# Patient Record
Sex: FEMALE | Race: WHITE | NOT HISPANIC OR LATINO | Employment: FULL TIME | URBAN - NONMETROPOLITAN AREA
[De-identification: names, ages, dates, MRNs, and addresses within clinical notes are randomized per-mention and may not be internally consistent; named-entity substitution may affect disease eponyms.]

---

## 2018-01-16 PROBLEM — Z81.8 FAMILY HISTORY OF PSYCHIATRIC CONDITION: Status: ACTIVE | Noted: 2018-01-16

## 2018-01-16 RX ORDER — CALCIUM CARBONATE 500 MG/1
TABLET, CHEWABLE ORAL
COMMUNITY

## 2019-01-29 ENCOUNTER — OFFICE VISIT (OUTPATIENT)
Dept: FAMILY MEDICINE | Facility: OTHER | Age: 39
End: 2019-01-29
Attending: FAMILY MEDICINE
Payer: COMMERCIAL

## 2019-01-29 VITALS
RESPIRATION RATE: 24 BRPM | BODY MASS INDEX: 34.55 KG/M2 | TEMPERATURE: 97.8 F | WEIGHT: 195 LBS | SYSTOLIC BLOOD PRESSURE: 112 MMHG | HEART RATE: 84 BPM | DIASTOLIC BLOOD PRESSURE: 70 MMHG | HEIGHT: 63 IN

## 2019-01-29 DIAGNOSIS — Z00.00 ANNUAL PHYSICAL EXAM: Primary | ICD-10-CM

## 2019-01-29 DIAGNOSIS — Z12.4 CERVICAL CANCER SCREENING: ICD-10-CM

## 2019-01-29 DIAGNOSIS — Z30.432 ENCOUNTER FOR IUD REMOVAL: ICD-10-CM

## 2019-01-29 LAB — HCG UR QL: NEGATIVE

## 2019-01-29 PROCEDURE — 88142 CYTOPATH C/V THIN LAYER: CPT | Performed by: FAMILY MEDICINE

## 2019-01-29 PROCEDURE — 81025 URINE PREGNANCY TEST: CPT | Performed by: FAMILY MEDICINE

## 2019-01-29 PROCEDURE — 99395 PREV VISIT EST AGE 18-39: CPT | Mod: 25 | Performed by: FAMILY MEDICINE

## 2019-01-29 PROCEDURE — G0123 SCREEN CERV/VAG THIN LAYER: HCPCS | Performed by: FAMILY MEDICINE

## 2019-01-29 PROCEDURE — 58301 REMOVE INTRAUTERINE DEVICE: CPT | Performed by: FAMILY MEDICINE

## 2019-01-29 RX ORDER — BACLOFEN 20 MG
TABLET ORAL
COMMUNITY

## 2019-01-29 RX ORDER — IBUPROFEN/PSEUDOEPHEDRINE HCL 200MG-30MG
TABLET ORAL
COMMUNITY

## 2019-01-29 ASSESSMENT — PATIENT HEALTH QUESTIONNAIRE - PHQ9
5. POOR APPETITE OR OVEREATING: NEARLY EVERY DAY
SUM OF ALL RESPONSES TO PHQ QUESTIONS 1-9: 19

## 2019-01-29 ASSESSMENT — PAIN SCALES - GENERAL: PAINLEVEL: NO PAIN (0)

## 2019-01-29 ASSESSMENT — ANXIETY QUESTIONNAIRES
5. BEING SO RESTLESS THAT IT IS HARD TO SIT STILL: MORE THAN HALF THE DAYS
6. BECOMING EASILY ANNOYED OR IRRITABLE: MORE THAN HALF THE DAYS
7. FEELING AFRAID AS IF SOMETHING AWFUL MIGHT HAPPEN: NEARLY EVERY DAY
IF YOU CHECKED OFF ANY PROBLEMS ON THIS QUESTIONNAIRE, HOW DIFFICULT HAVE THESE PROBLEMS MADE IT FOR YOU TO DO YOUR WORK, TAKE CARE OF THINGS AT HOME, OR GET ALONG WITH OTHER PEOPLE: SOMEWHAT DIFFICULT
1. FEELING NERVOUS, ANXIOUS, OR ON EDGE: NEARLY EVERY DAY
3. WORRYING TOO MUCH ABOUT DIFFERENT THINGS: NEARLY EVERY DAY
GAD7 TOTAL SCORE: 19
2. NOT BEING ABLE TO STOP OR CONTROL WORRYING: NEARLY EVERY DAY

## 2019-01-29 ASSESSMENT — MIFFLIN-ST. JEOR: SCORE: 1534.67

## 2019-01-29 NOTE — NURSING NOTE
Patient is here for annual physical, patient states has iud, has been 6 years.  Meera Villanueva LPN .............1/29/2019     3:17 PM        No LMP recorded (lmp unknown). Patient is not currently having periods (Reason: IUD).  Medication Reconciliation: complete    Meera Villanueva LPN  1/29/2019 3:23 PM

## 2019-01-29 NOTE — PROGRESS NOTES
Female Physical Note    Concerns today:     38-year-old female presents for her annual physical.  She has not been seen in over 5 years due to lack of health insurance. IUD has now been in place for over 6 years.  She reports the last 2 months having intermittent vaginal bleeding.  She is unable to locate her strings.  She has had the same sexual partner.  She would like to have the IUD replaced as this is her form of birth control.  She has no history of abnormal Pap smears.  Nursing Notes:   Meera Villanueva LPN  2019  3:23 PM  Sign at exiting of workspace  Patient is here for annual physical, patient states has iud, has been 6 years.  Meera Villanueva LPN .............2019     3:17 PM        No LMP recorded (lmp unknown). Patient is not currently having periods (Reason: IUD).  Medication Reconciliation: complete    Meera Villanueva LPN  2019 3:23 PM          ROS:  CONSTITUTIONAL: no fatigue, no unexpected change in weight  SKIN: no worrisome rashes, no worrisome moles, no worrisome lesions  EYES: no acute vision problems or changes  ENT: no ear problems, no mouth problems, no throat problems  RESP: no significant cough, no shortness of breath  CV: no chest pain, no palpitations, no new or worsening peripheral edema  GI: no nausea, no vomiting, no constipation, no diarrhea    Sexually Active: Yes  Sexual concerns: No   Contraception:IUD   P: 0  Menarche:  No LMP recorded (lmp unknown). Patient is not currently having periods (Reason: IUD). Menses: q 60 days  STD History: Neg  Last Pap Smear Date:  normal  Abnormal Pap History: None    Patient Active Problem List   Diagnosis     Allergic rhinitis     Breast hypertrophy     Encounter for other general counseling or advice on contraception     Family history of psychiatric condition     Carbuncle and furuncle     Cervicitis and endocervicitis     Obesity       Current Outpatient Medications   Medication Sig Dispense Refill     levonorgestrel  (MIRENA) 20 MCG/24HR IUD Due to come out 2018       Magnesium Oxide 500 MG TABS        Melatonin 3 MG TBDP        calcium carbonate (TUMS) 500 MG chewable tablet        diphenhydrAMINE-acetaminophen (TYLENOL PM)  MG tablet          Past Medical History:   Diagnosis Date     Allergy status to unspecified drugs, medicaments and biological substances status     nfections- allergies     Anxiety disorder     Anxiety Disorder     Other specified postprocedural states     2005,Elective     Personal history of other medical treatment (CODE)     2005,Abn. pap 2005        Family History     Problem (# of Occurrences) Relation (Name,Age of Onset)    Arthritis (1) Paternal Grandmother: Arthritis    Cancer (1) Father: Cancer,brain tumor    Diabetes (1) Paternal Aunt: Diabetes    Family History Negative (3) Sister: Good Health, Brother: Good Health,unsure of where abouts due to mother lost children, Sister: Good Health,unsure of where about due to mother lost children    Genetic Disorder (2) Other: Genetic,FH Heart Disease-MGM,  age 57-FH Hypertension-mother-FH Thyroid Problems-mother-FH Depression-mother (bipolar), MGM-FH Wtayjt-LFD-SW Fejkyjuwehhr-MET-KH Skin cancer-PGM, Other: Genetic,FH Heart Disease-MGM,  age 57-FH Hypertension-mother-FH Thyroid Problems-mother-FH Depression-mother (bipolar), MGM-FH Prgogt-UQW-AO Bmmieipwdqvf-UZY-WY Skin cancer-PGM-Father - healthy-Siblings - healthy    Heart Disease (2) Maternal Grandmother: Heart Disease, Paternal Grandfather: Heart Disease    Other - See Comments (2) Mother: Psychiatric illness,Bipolar, Maternal Grandmother: Stroke,smoker    Unknown/Adopted (1) Maternal Grandfather: Unknown          Problem List Medication List and Allergy List were reviewed.    Patient is an established patient of this clinic..    Social History     Tobacco Use     Smoking status: Never Smoker     Smokeless tobacco: Never Used   Substance Use Topics     Alcohol use: Yes      "Comment: Alcoholic Drinks/day: 1 drink/week     Partnered  Children ? no      RISK BEHAVIORS AND HEALTHY HABITS:  Tobacco Use/Smoking: None  Illicit Drug Use: None  Do you use alcohol? Yes  Number of drinking days a week 2  Diet (5-7 servings of fruits/veg daily): Yes   Exercise (30 min accumulated most days):No  Dental Care: Yes   Calcium 1500 mg/d:  Yes  Seat Belt Use: Yes         CV Risk based on Pooled Cohort Risk:   Pooled Cohort Risk Calculator    Immunization History   Administered Date(s) Administered     Influenza Vaccine IM 3yrs+ 4 Valent IIV4 09/15/2015     TD (ADULT, 7+) 04/25/2007    Reviewed Immunization Record Today    EXAMINATION:   /70 (BP Location: Right arm, Patient Position: Sitting, Cuff Size: Adult Large)   Pulse 84   Temp 97.8  F (36.6  C) (Tympanic)   Resp 24   Ht 1.594 m (5' 2.75\")   Wt 88.5 kg (195 lb)   LMP  (LMP Unknown)   Breastfeeding? No   BMI 34.82 kg/m    GENERAL: healthy, alert and no distress  EYES: Eyes grossly normal to inspection, extraocular movements - intact, and PERRL  HENT: ear canals- normal; TMs- normal; Nose- normal; Mouth- no ulcers, no lesions  NECK: no tenderness, no adenopathy, no asymmetry, no masses, no stiffness; thyroid- normal to palpation  RESP: lungs clear to auscultation - no rales, no rhonchi, no wheezes  BREAST: no masses, no tenderness, no nipple discharge, no palpable axillary masses or adenopathy  CV: regular rates and rhythm, normal S1 S2, no S3 or S4 and no murmur, no click or rub -  ABDOMEN: soft, no tenderness, no  hepatosplenomegaly, no masses, normal bowel sounds  MS: extremities- no gross deformities noted, no edema  SKIN: no suspicious lesions, no rashes  NEURO: strength and tone- normal, sensory exam- grossly normal, mentation- intact, speech- normal, reflexes- symmetric  BACK: no CVA tenderness, no paralumbar tenderness  - female: cervix- normal, adnexae- normal; uterus- normal, no masses, no discharge  - female: IUD strings " are visualized and grabbed with ring forceps.  With moderate traction the IUD is removed.  There is moderate amount of bleeding from the cervical loss.  This is stopped with pressure.  LYMPHATICS: ant. cervical- normal, post. cervical- normal, axillary- normal, supraclavicular- normal, inguinal- normal    ASSESSMENT:  1. Annual physical exam    2. Cervical cancer screening    3. Encounter for IUD removal          PLAN:  Patient is counseled on importance of regular self breast exams and mammograms every 1-2 years starting at age 40. Patient will proceed with pap smears every 3-5 years until age 65. Discussed importance of calcium and vitamin D supplementation and osteoporosis screening. Immunizations are updated based on CDC recommendations and patients desire. Reviewed importance of sunscreen, limit sun exposure and monitoring for changing moles with ABCDEs. Recommend seatbelt use and helmets with biking, skiing and ATV/Snowmobile use.      IUD was removed today with some difficulty.  This resulted in a moderate amount of bleeding.    Recommend she follow-up next week for replacement.  She is comfortable with this plan.  She will start breast cancer screening at the age of 41.  Declines influenza vaccine today.

## 2019-01-29 NOTE — PATIENT INSTRUCTIONS
IUD removed today, expect period   Check pregnancy test today, if negative, will plan to place new IUD next Tuesday    Pap smear done today, repeat every 5  Years    Mammogram starts at age 40

## 2019-01-30 ASSESSMENT — ANXIETY QUESTIONNAIRES: GAD7 TOTAL SCORE: 19

## 2019-02-01 LAB
COPATH REPORT: NORMAL
PAP: NORMAL

## 2019-02-05 ENCOUNTER — OFFICE VISIT (OUTPATIENT)
Dept: FAMILY MEDICINE | Facility: OTHER | Age: 39
End: 2019-02-05
Attending: FAMILY MEDICINE
Payer: COMMERCIAL

## 2019-02-05 VITALS
HEART RATE: 90 BPM | TEMPERATURE: 98.4 F | DIASTOLIC BLOOD PRESSURE: 64 MMHG | WEIGHT: 195 LBS | RESPIRATION RATE: 24 BRPM | SYSTOLIC BLOOD PRESSURE: 106 MMHG | BODY MASS INDEX: 34.82 KG/M2

## 2019-02-05 DIAGNOSIS — Z30.09 ENCOUNTER FOR OTHER GENERAL COUNSELING OR ADVICE ON CONTRACEPTION: Primary | ICD-10-CM

## 2019-02-05 DIAGNOSIS — Z30.430 ENCOUNTER FOR IUD INSERTION: ICD-10-CM

## 2019-02-05 PROCEDURE — 25000125 ZZHC RX 250: Performed by: FAMILY MEDICINE

## 2019-02-05 PROCEDURE — 58300 INSERT INTRAUTERINE DEVICE: CPT | Performed by: FAMILY MEDICINE

## 2019-02-05 RX ADMIN — LEVONORGESTREL 20 MCG: 52 INTRAUTERINE DEVICE INTRAUTERINE at 16:18

## 2019-02-05 ASSESSMENT — PAIN SCALES - GENERAL: PAINLEVEL: NO PAIN (0)

## 2019-02-05 NOTE — PROGRESS NOTES
INDICATIONS:                                                      Is a pregnancy test required: Yes.  Was it positive or negative?  Negative  Was a consent obtained?  Yes    Stephanie Mcclelland is a 38 year old female, No obstetric history on file.  who presents for insertion of an IUD. The risks, benefits and alternatives of IUD insertion were discussed in detail previously. She also has reviewed the product brochure.  She has elected to go ahead with the insertion  today and her questions were answered. Consent was signed. Her LMP began on 3days ago and was normal in duration and amount of flow. A pregnancy test was performed today:  No    Today's PHQ-2 Score:   PHQ-2 ( 1999 Pfizer) 1/29/2019   Q1: Little interest or pleasure in doing things 1   Q2: Feeling down, depressed or hopeless 3   PHQ-2 Score 4       PROCEDURE:                                                      The pelvic exam revealed normal external genitalia. On bimanual exam the uterus was Anteverted and Midposition and normal in size with no tenderness present. A speculum was inserted into the vagina and the cervix was visualized. The cervix was prepped with Betadine . The anterior lip of the cervix was grasped with a single toothed tenaculum. The uterus sounded to 7 cm. A Mirena IUD was then inserted without difficulty. The string was cut to 2 cm.  The patient experienced a mild amount of cramping.    POST PROCEDURE:                                                      She  tolerated the procedure well. There were no complications. Patient was discharged in stable condition.    Return to clinic in 5 weeks for IUD check.  Call if severe cramping, fever, abnormal bleeding, abnormal discharge or pelvic pain develop.  Patient was counseled about the chance of irregular bleeding.    Xi Medrano MD

## 2019-02-05 NOTE — NURSING NOTE
Patient is here to have an iud placed.  Meera Villanueva LPN .............2/5/2019     3:12 PM        No LMP recorded (lmp unknown). Patient is not currently having periods (Reason: IUD).  Medication Reconciliation: complete    Meera Villanueva LPN  2/5/2019 3:16 PM

## 2019-07-11 ENCOUNTER — OFFICE VISIT (OUTPATIENT)
Dept: FAMILY MEDICINE | Facility: OTHER | Age: 39
End: 2019-07-11
Attending: PHYSICIAN ASSISTANT
Payer: COMMERCIAL

## 2019-07-11 VITALS
DIASTOLIC BLOOD PRESSURE: 80 MMHG | RESPIRATION RATE: 20 BRPM | TEMPERATURE: 99 F | SYSTOLIC BLOOD PRESSURE: 122 MMHG | BODY MASS INDEX: 33 KG/M2 | WEIGHT: 184.8 LBS | HEART RATE: 62 BPM

## 2019-07-11 DIAGNOSIS — F32.2 SEVERE DEPRESSION (H): ICD-10-CM

## 2019-07-11 DIAGNOSIS — F41.9 SEVERE ANXIETY: Primary | ICD-10-CM

## 2019-07-11 PROCEDURE — 99214 OFFICE O/P EST MOD 30 MIN: CPT | Performed by: PHYSICIAN ASSISTANT

## 2019-07-11 ASSESSMENT — ANXIETY QUESTIONNAIRES
6. BECOMING EASILY ANNOYED OR IRRITABLE: NEARLY EVERY DAY
5. BEING SO RESTLESS THAT IT IS HARD TO SIT STILL: NEARLY EVERY DAY
GAD7 TOTAL SCORE: 21
IF YOU CHECKED OFF ANY PROBLEMS ON THIS QUESTIONNAIRE, HOW DIFFICULT HAVE THESE PROBLEMS MADE IT FOR YOU TO DO YOUR WORK, TAKE CARE OF THINGS AT HOME, OR GET ALONG WITH OTHER PEOPLE: EXTREMELY DIFFICULT
1. FEELING NERVOUS, ANXIOUS, OR ON EDGE: NEARLY EVERY DAY
7. FEELING AFRAID AS IF SOMETHING AWFUL MIGHT HAPPEN: NEARLY EVERY DAY
3. WORRYING TOO MUCH ABOUT DIFFERENT THINGS: NEARLY EVERY DAY
2. NOT BEING ABLE TO STOP OR CONTROL WORRYING: NEARLY EVERY DAY

## 2019-07-11 ASSESSMENT — PATIENT HEALTH QUESTIONNAIRE - PHQ9
5. POOR APPETITE OR OVEREATING: NEARLY EVERY DAY
SUM OF ALL RESPONSES TO PHQ QUESTIONS 1-9: 18

## 2019-07-11 NOTE — PATIENT INSTRUCTIONS
Started on sertraline 50 mg.  Encouraged good diet and exercise.   Encouraged to see a counselor.   Return in 4-6 weeks for recheck.   Return to clinic with change/worsening of symptoms.       Suicide Emergency First call for help:  [phone]    Depression: Tips to Help Yourself  As your healthcare providers help treat your depression, you can also help yourself. Keep in mind that your illness affects you emotionally, physically, mentally, and socially. So full recovery will take time. Take care of your body and your soul, and be patient with yourself as you get better.    Self-care    Educate yourself. Read about treatment and medicine options. If you have the energy, attend local conferences or support groups. Keep a list of useful websites and helpful books and use them as needed. This illness is not your fault. Don t blame yourself for your depression.    Manage early symptoms. If you notice symptoms returning, experience triggers, or identify other factors that may lead to a depressive episode, get help as soon as possible. Ask trusted friends and family to monitor your behavior and let you know if they see anything of concern.    Work with your provider. Find a provider you can trust. Communicate honestly with that person and share information on your treatment for depression and your reaction to medicines.    Be prepared for a crisis. Know what to do if you experience a crisis. Keep the phone number of a crisis hotline and know the location of your community's urgent care centers and the closest emergency department.    Hold off on big decisions. Depression can cloud your judgment. So wait until you feel better before making major life decisions, such as changing jobs, moving, or getting  or .    Be patient. Recovering from depression is a process. Don t be discouraged if it takes some time to feel better.    Keep it simple. Depression saps your energy and concentration. So  you won t be able to do all the things you used to do. Set small goals and do what you can.    Be with others. Don t isolate yourself--you ll only feel worse. Try to be with other people. And take part in fun activities when you can. Go to a movie, ballgame, [employer] service, or social event. Talk openly with people you can trust. And accept help when it s offered.  Take care of your body  People with depression often lose the desire to take care of themselves. That only makes their problems worse. During treatment and afterward, make a point to:    Exercise. It s a great way to take care of your body. And studies have shown that exercise helps fight depression.    Avoid drugs and alcohol. These may ease the pain in the short term. But they ll only make your problems worse in the long run.    Get relief from stress. Ask your healthcare provider for relaxation exercises and techniques to help relieve stress.    Eat right. A balanced and healthy diet helps keep your body healthy.  Date Last Reviewed: 1/1/2017 2000-2017 [employer]. [address] [place] [address]. All rights reserved. This information is not intended as a substitute for professional medical care. Always follow your healthcare professional's instructions.         Treating Anxiety Disorders with Therapy    If you have an anxiety disorder, you don t have to suffer anymore. Treatment is available. Therapy (also called counseling) is often a helpful treatment for anxiety disorders. With therapy, a specially trained professional (therapist) helps you face and learn to manage your anxiety. Therapy can be short-term or long-term depending on your needs. In some cases, medicine may also be prescribed with therapy. It may take time before you notice how much therapy is helping, but stick with it. With therapy, you can feel better.  Cognitive behavioral therapy (CBT)  Cognitive behavioral therapy (CBT) teaches you to manage anxiety.  It does this by helping you understand how you think and act when you re anxious. Research has shown CBT to be a very effective treatment for anxiety disorders. How CBT is run is almost like a class. It involves homework and activities to build skills that teach you to cope with anxiety step by step. It can be done in a group or one-on-one, and often takes place for a set number of sessions. CBT has two main parts:    Cognitive therapy helps you identify the negative, irrational thoughts that occur with your anxiety. You ll learn to replace these with more positive, realistic thoughts.    Behavioral therapy helps you change how you react to anxiety. You ll learn coping skills and methods for relaxing to help you better deal with anxiety.  Other forms of therapy  Other therapy methods may work better for you than CBT. Or, you may move from CBT to another form of therapy as your treatment needs change. This may mean meeting with a therapist by yourself or in a group. Therapy can also help you work through problems in your life, such as drug or alcohol dependence, that may be making your anxiety worse.  Getting better takes time  Therapy will help you feel better and teach you skills to help manage anxiety long term. But change doesn t happen right away. It takes a commitment from you. And treatment only works if you learn to face the causes of your anxiety. So, you might feel worse before you feel better. This can sometimes make it hard to stick with it. But remember: Therapy is a very effective treatment. The results will be well worth it.  Helping yourself  If anxiety is wearing you down, here are some things you can do to cope:    Check with your doctor and rule out any physical problems that may be causing the anxiety symptoms.    If an anxiety disorder is diagnosed, seek mental healthcare. This is an illness and it can respond to treatment. Most types of anxiety disorders will respond to talk therapy and  medicine.    Educate yourself about anxiety disorders. Keep track of helpful online resources and books you can use during stressful periods.    Try stress management techniques such as meditation.    Consider online or in-person support groups.    Don t fight your feelings. Anxiety feeds itself. The more you worry about it, the worse it gets. Instead, try to identify what might have triggered your anxiety. Then try to put this threat in perspective.    Keep in mind that you can t control everything about a situation. Change what you can and let the rest take its course.    Exercise -- it s a great way to relieve tension and help your body feel relaxed.    Examine your life for stress, and try to find ways to reduce it.    Avoid caffeine and nicotine, which can make anxiety symptoms worse.    Fight the temptation to turn to alcohol or unprescribed drugs for relief. They only make things worse in the long run.   Date Last Reviewed: 1/1/2017 2000-2017 [employer]. [address] [place] [address], [place] [address]. All rights reserved. This information is not intended as a substitute for professional medical care. Always follow your healthcare professional's instructions.

## 2019-07-11 NOTE — NURSING NOTE
Chief Complaint   Patient presents with     Depression     Anxiety         Medication Reconciliation: complete    Tiffanie Iqbal, LPN

## 2019-07-11 NOTE — PROGRESS NOTES
Nursing Notes:   Tiffanie Iqbal LPN  7/11/2019  2:58 PM  Signed  Chief Complaint   Patient presents with     Depression     Anxiety         Medication Reconciliation: complete    Tiffanie Iqbal LPN      HPI:    Stephanie Mcclelland is a 38 year old female who presents for anxiety and depression. Hx medication. Was first on prozac - numbness feeling side effect. Then switched to effexor. Would get sick when she missed them.  Not interested in trying Effexor again at this time.  Symptoms have been worse x 1 year. Mom has bipolar. Anxious, does not like to leave the house.  Currently engaged. Anxious about weeding date.  Fiancé states that she is mean at times.  Down thoughts.  Currently on a keto diet. Have dogs.  Suicidal thoughts.  No current suicidal plans or actions that she is completed previously.  No thoughts of leaving here today and harming herself.  Feels comfortable speaking with someone prior to having anything concerning happen.  No interest.  Panic attacks daily.      Past Medical History:   Diagnosis Date     Allergy status to unspecified drugs, medicaments and biological substances status     nfections- allergies     Anxiety disorder     Anxiety Disorder     Other specified postprocedural states     05/2005,Elective     Personal history of other medical treatment (CODE)     2005,Abn. pap 2005       Past Surgical History:   Procedure Laterality Date     ADENOIDECTOMY      child     MAMMOPLASTY AUGMENTATION      07/2012, Bilateral reduction mammoplasty and suction assisted lipectomy       Family History   Problem Relation Age of Onset     Other - See Comments Mother         Psychiatric illness,Bipolar     Cancer Father         Cancer,brain tumor     Unknown/Adopted Maternal Grandfather         Unknown     Other - See Comments Maternal Grandmother         Stroke,smoker     Heart Disease Maternal Grandmother         Heart Disease     Arthritis Paternal Grandmother         Arthritis     Heart  Disease Paternal Grandfather         Heart Disease     Family History Negative Sister         Good Health     Family History Negative Brother         Good Health,unsure of where abouts due to mother lost children     Family History Negative Sister         Good Health,unsure of where about due to mother lost children     Genetic Disorder Other         Genetic,FH Heart Disease-MGM,  age 57-FH Hypertension-mother-FH Thyroid Problems-mother-FH Depression-mother (bipolar), MGM-FH Yavifp-JVE-IO Lbbyrfajgpwy-XHM-QE Skin cancer-PGM     Genetic Disorder Other         Genetic,FH Heart Disease-MGM,  age 57-FH Hypertension-mother-FH Thyroid Problems-mother-FH Depression-mother (bipolar), MGM-FH Moxxpd-IVU-HA Dlqyimykgpui-USE-FM Skin cancer-PGM-Father - healthy-Siblings - healthy     Diabetes Paternal Aunt         Diabetes       Social History     Tobacco Use     Smoking status: Never Smoker     Smokeless tobacco: Never Used   Substance Use Topics     Alcohol use: Yes     Comment: Alcoholic Drinks/day: 1 drink/week       Current Outpatient Medications   Medication Sig Dispense Refill     calcium carbonate (TUMS) 500 MG chewable tablet        diphenhydrAMINE-acetaminophen (TYLENOL PM)  MG tablet        levonorgestrel (MIRENA) 20 MCG/24HR IUD        Magnesium Oxide 500 MG TABS        Melatonin 3 MG TBDP        sertraline (ZOLOFT) 50 MG tablet Take 1 tablet (50 mg) by mouth daily 30 tablet 1       Allergies   Allergen Reactions     Sulfa Drugs Hives and Nausea and Vomiting       REVIEW OFSYSTEMS:  Refer to HPI.    EXAM:   Vitals:    /80 (BP Location: Right arm, Patient Position: Sitting, Cuff Size: Adult Large)   Pulse 62   Temp 99  F (37.2  C)   Resp 20   Wt 83.8 kg (184 lb 12.8 oz)   BMI 33.00 kg/m    General appearance:appropriately dressed and well groomed  Attitude: cooperative  Behavior: normal  Eye Contact: normal  Speech: normal  Orientation: oriented to person, place, time and situation  Mood:   admits moderate sadness and anxiety  Affect: Mood Congruent  Thought Process: clear  Suicidal or Homicidal Ideation: reports no thoughts or intentions  Hallucination: no    PHQ Depression Screen  PHQ-9 SCORE 1/15/2016 1/29/2019 7/11/2019   PHQ-9 Total Score 8 19 18       PEDRO Anxiety Screen  PEDRO-7 SCORE 1/15/2016 1/29/2019 7/11/2019   Total Score 4 19 21       ASSESSMENT AND PLAN:      ICD-10-CM    1. Severe anxiety F41.9 sertraline (ZOLOFT) 50 MG tablet   2. Severe depression (H) F32.2 sertraline (ZOLOFT) 50 MG tablet       Anxiety depression:  Started on sertraline 50 mg.  Gave side effect profile.  Encouraged good diet and exercise.   Encouraged to see a counselor.   Return in 4-6 weeks for recheck.   Return to clinic with change/worsening of symptoms.     Greater than 25 minutes were spent in counseling and coordination of care.     Patient Instructions     Started on sertraline 50 mg.  Encouraged good diet and exercise.   Encouraged to see a counselor.   Return in 4-6 weeks for recheck.   Return to clinic with change/worsening of symptoms.       Suicide Emergency First call for help:  [phone]    Depression: Tips to Help Yourself  As your healthcare providers help treat your depression, you can also help yourself. Keep in mind that your illness affects you emotionally, physically, mentally, and socially. So full recovery will take time. Take care of your body and your soul, and be patient with yourself as you get better.    Self-care    Educate yourself. Read about treatment and medicine options. If you have the energy, attend local conferences or support groups. Keep a list of useful websites and helpful books and use them as needed. This illness is not your fault. Don t blame yourself for your depression.    Manage early symptoms. If you notice symptoms returning, experience triggers, or identify other factors that may lead to a depressive episode, get help as soon as possible. Ask trusted  friends and family to monitor your behavior and let you know if they see anything of concern.    Work with your provider. Find a provider you can trust. Communicate honestly with that person and share information on your treatment for depression and your reaction to medicines.    Be prepared for a crisis. Know what to do if you experience a crisis. Keep the phone number of a crisis hotline and know the location of your community's urgent care centers and the closest emergency department.    Hold off on big decisions. Depression can cloud your judgment. So wait until you feel better before making major life decisions, such as changing jobs, moving, or getting  or .    Be patient. Recovering from depression is a process. Don t be discouraged if it takes some time to feel better.    Keep it simple. Depression saps your energy and concentration. So you won t be able to do all the things you used to do. Set small goals and do what you can.    Be with others. Don t isolate yourself--you ll only feel worse. Try to be with other people. And take part in fun activities when you can. Go to a movie, [employer], Jewish service, or social event. Talk openly with people you can trust. And accept help when it s offered.  Take care of your body  People with depression often lose the desire to take care of themselves. That only makes their problems worse. During treatment and afterward, make a point to:    Exercise. It s a great way to take care of your body. And studies have shown that exercise helps fight depression.    Avoid drugs and alcohol. These may ease the pain in the short term. But they ll only make your problems worse in the long run.    Get relief from stress. Ask your healthcare provider for relaxation exercises and techniques to help relieve stress.    Eat right. A balanced and healthy diet helps keep your body healthy.  Date Last Reviewed: 1/1/2017 2000-2017 [employer]. [address] [place] [address]  [facility], [place] [address]. All rights reserved. This information is not intended as a substitute for professional medical care. Always follow your healthcare professional's instructions.         Treating Anxiety Disorders with Therapy    If you have an anxiety disorder, you don t have to suffer anymore. Treatment is available. Therapy (also called counseling) is often a helpful treatment for anxiety disorders. With therapy, a specially trained professional (therapist) helps you face and learn to manage your anxiety. Therapy can be short-term or long-term depending on your needs. In some cases, medicine may also be prescribed with therapy. It may take time before you notice how much therapy is helping, but stick with it. With therapy, you can feel better.  Cognitive behavioral therapy (CBT)  Cognitive behavioral therapy (CBT) teaches you to manage anxiety. It does this by helping you understand how you think and act when you re anxious. Research has shown CBT to be a very effective treatment for anxiety disorders. How CBT is run is almost like a class. It involves homework and activities to build skills that teach you to cope with anxiety step by step. It can be done in a group or one-on-one, and often takes place for a set number of sessions. CBT has two main parts:    Cognitive therapy helps you identify the negative, irrational thoughts that occur with your anxiety. You ll learn to replace these with more positive, realistic thoughts.    Behavioral therapy helps you change how you react to anxiety. You ll learn coping skills and methods for relaxing to help you better deal with anxiety.  Other forms of therapy  Other therapy methods may work better for you than CBT. Or, you may move from CBT to another form of therapy as your treatment needs change. This may mean meeting with a therapist by yourself or in a group. Therapy can also help you work through problems in your life, such as drug or alcohol dependence,  that may be making your anxiety worse.  Getting better takes time  Therapy will help you feel better and teach you skills to help manage anxiety long term. But change doesn t happen right away. It takes a commitment from you. And treatment only works if you learn to face the causes of your anxiety. So, you might feel worse before you feel better. This can sometimes make it hard to stick with it. But remember: Therapy is a very effective treatment. The results will be well worth it.  Helping yourself  If anxiety is wearing you down, here are some things you can do to cope:    Check with your doctor and rule out any physical problems that may be causing the anxiety symptoms.    If an anxiety disorder is diagnosed, seek mental healthcare. This is an illness and it can respond to treatment. Most types of anxiety disorders will respond to talk therapy and medicine.    Educate yourself about anxiety disorders. Keep track of helpful online resources and books you can use during stressful periods.    Try stress management techniques such as meditation.    Consider online or in-person support groups.    Don t fight your feelings. Anxiety feeds itself. The more you worry about it, the worse it gets. Instead, try to identify what might have triggered your anxiety. Then try to put this threat in perspective.    Keep in mind that you can t control everything about a situation. Change what you can and let the rest take its course.    Exercise -- it s a great way to relieve tension and help your body feel relaxed.    Examine your life for stress, and try to find ways to reduce it.    Avoid caffeine and nicotine, which can make anxiety symptoms worse.    Fight the temptation to turn to alcohol or unprescribed drugs for relief. They only make things worse in the long run.   Date Last Reviewed: 1/1/2017 2000-2017 [employer]. [address] [facility], [place] [address]. All rights reserved. This information is not  intended as a substitute for professional medical care. Always follow your healthcare professional's instructions.             Reviewed risks and benefits of medications and other treatment options.   Pt is advised to call if side effects to medications occur, especially if exaggerated/dramatic.    Mariaelena Gutierres PA-C..................7/11/2019 2:57 PM

## 2019-07-12 ASSESSMENT — ANXIETY QUESTIONNAIRES: GAD7 TOTAL SCORE: 21

## 2019-08-09 ENCOUNTER — MYC MEDICAL ADVICE (OUTPATIENT)
Dept: FAMILY MEDICINE | Facility: OTHER | Age: 39
End: 2019-08-09

## 2019-08-09 DIAGNOSIS — F32.2 SEVERE DEPRESSION (H): ICD-10-CM

## 2019-08-09 DIAGNOSIS — F41.9 SEVERE ANXIETY: ICD-10-CM

## 2019-08-09 NOTE — TELEPHONE ENCOUNTER
Per My chart message- I was hoping to renew my prescription with the insurance companies online for a cheaper price but it looks like they only do 3 month supply. Was hoping to try these out a little longer. My dad says he sees improvement.  I only have 3 day supply left. [facility] rx [phone]. If not please let me know so i can go to [facility]      Contacted patient and reminded that she is due for a follow up OV. Patient agreed to be transferred to scheduling. PCP is out. Will route to [facility] for consideration.     Zoloft    Last Written Prescription Date: 07/11/2019- new medication   Last Fill Quantity: 30,   # refills: 1  Last Office Visit: 07/11/2019- follow up in 4-6 weeks.   Future Office visit:       Routing refill request to provider for review/approval.    Unable to complete prescription refill per RNMedication Refill Policy.................... Patricia Laird ....................  8/9/2019   9:21 AM

## 2019-08-16 ENCOUNTER — OFFICE VISIT (OUTPATIENT)
Dept: FAMILY MEDICINE | Facility: OTHER | Age: 39
End: 2019-08-16
Attending: PHYSICIAN ASSISTANT
Payer: COMMERCIAL

## 2019-08-16 VITALS
HEIGHT: 63 IN | BODY MASS INDEX: 30.97 KG/M2 | HEART RATE: 76 BPM | DIASTOLIC BLOOD PRESSURE: 62 MMHG | RESPIRATION RATE: 18 BRPM | SYSTOLIC BLOOD PRESSURE: 110 MMHG | TEMPERATURE: 97.3 F | WEIGHT: 174.8 LBS

## 2019-08-16 DIAGNOSIS — F41.9 SEVERE ANXIETY: Primary | ICD-10-CM

## 2019-08-16 DIAGNOSIS — F32.2 SEVERE DEPRESSION (H): ICD-10-CM

## 2019-08-16 PROCEDURE — 99213 OFFICE O/P EST LOW 20 MIN: CPT | Performed by: PHYSICIAN ASSISTANT

## 2019-08-16 ASSESSMENT — ANXIETY QUESTIONNAIRES
5. BEING SO RESTLESS THAT IT IS HARD TO SIT STILL: NOT AT ALL
3. WORRYING TOO MUCH ABOUT DIFFERENT THINGS: SEVERAL DAYS
1. FEELING NERVOUS, ANXIOUS, OR ON EDGE: SEVERAL DAYS
7. FEELING AFRAID AS IF SOMETHING AWFUL MIGHT HAPPEN: NOT AT ALL
6. BECOMING EASILY ANNOYED OR IRRITABLE: NOT AT ALL
GAD7 TOTAL SCORE: 4
IF YOU CHECKED OFF ANY PROBLEMS ON THIS QUESTIONNAIRE, HOW DIFFICULT HAVE THESE PROBLEMS MADE IT FOR YOU TO DO YOUR WORK, TAKE CARE OF THINGS AT HOME, OR GET ALONG WITH OTHER PEOPLE: SOMEWHAT DIFFICULT
2. NOT BEING ABLE TO STOP OR CONTROL WORRYING: MORE THAN HALF THE DAYS

## 2019-08-16 ASSESSMENT — MIFFLIN-ST. JEOR: SCORE: 1442.02

## 2019-08-16 ASSESSMENT — PATIENT HEALTH QUESTIONNAIRE - PHQ9
5. POOR APPETITE OR OVEREATING: NOT AT ALL
SUM OF ALL RESPONSES TO PHQ QUESTIONS 1-9: 0

## 2019-08-16 NOTE — PATIENT INSTRUCTIONS
Please take 1500 mg calcium and [id number] units of vitamin D daily to keep your supplies up. Please have your vitamin D level rechecked in 3-6 months.      Continue sertraline  Encouraged good diet and exercise.   Encouraged to see a counselor.   Return in 1 year or sooner for recheck.   Return to clinic with change/worsening of symptoms.       Suicide Emergency First call for help:  [phone]    Depression: Tips to Help Yourself  As your healthcare providers help treat your depression, you can also help yourself. Keep in mind that your illness affects you emotionally, physically, mentally, and socially. So full recovery will take time. Take care of your body and your soul, and be patient with yourself as you get better.    Self-care    Educate yourself. Read about treatment and medicine options. If you have the energy, attend local conferences or support groups. Keep a list of useful websites and helpful books and use them as needed. This illness is not your fault. Don t blame yourself for your depression.    Manage early symptoms. If you notice symptoms returning, experience triggers, or identify other factors that may lead to a depressive episode, get help as soon as possible. Ask trusted friends and family to monitor your behavior and let you know if they see anything of concern.    Work with your provider. Find a provider you can trust. Communicate honestly with that person and share information on your treatment for depression and your reaction to medicines.    Be prepared for a crisis. Know what to do if you experience a crisis. Keep the phone number of a crisis hotline and know the location of your community's urgent care centers and the closest emergency department.    Hold off on big decisions. Depression can cloud your judgment. So wait until you feel better before making major life decisions, such as changing jobs, moving, or getting  or .    Be patient. Recovering from  depression is a process. Don t be discouraged if it takes some time to feel better.    Keep it simple. Depression saps your energy and concentration. So you won t be able to do all the things you used to do. Set small goals and do what you can.    Be with others. Don t isolate yourself--you ll only feel worse. Try to be with other people. And take part in fun activities when you can. Go to a movie, ballgame, Anabaptist service, or social event. Talk openly with people you can trust. And accept help when it s offered.  Take care of your body  People with depression often lose the desire to take care of themselves. That only makes their problems worse. During treatment and afterward, make a point to:    Exercise. It s a great way to take care of your body. And studies have shown that exercise helps fight depression.    Avoid drugs and alcohol. These may ease the pain in the short term. But they ll only make your problems worse in the long run.    Get relief from stress. Ask your healthcare provider for relaxation exercises and techniques to help relieve stress.    Eat right. A balanced and healthy diet helps keep your body healthy.  Date Last Reviewed: 1/1/2017 2000-2017 [employer]. [address] [place] [address]. All rights reserved. This information is not intended as a substitute for professional medical care. Always follow your healthcare professional's instructions.         Treating Anxiety Disorders with Therapy    If you have an anxiety disorder, you don t have to suffer anymore. Treatment is available. Therapy (also called counseling) is often a helpful treatment for anxiety disorders. With therapy, a specially trained professional (therapist) helps you face and learn to manage your anxiety. Therapy can be short-term or long-term depending on your needs. In some cases, medicine may also be prescribed with therapy. It may take time before you notice how much therapy is helping, but  stick with it. With therapy, you can feel better.  Cognitive behavioral therapy (CBT)  Cognitive behavioral therapy (CBT) teaches you to manage anxiety. It does this by helping you understand how you think and act when you re anxious. Research has shown CBT to be a very effective treatment for anxiety disorders. How CBT is run is almost like a class. It involves homework and activities to build skills that teach you to cope with anxiety step by step. It can be done in a group or one-on-one, and often takes place for a set number of sessions. CBT has two main parts:    Cognitive therapy helps you identify the negative, irrational thoughts that occur with your anxiety. You ll learn to replace these with more positive, realistic thoughts.    Behavioral therapy helps you change how you react to anxiety. You ll learn coping skills and methods for relaxing to help you better deal with anxiety.  Other forms of therapy  Other therapy methods may work better for you than CBT. Or, you may move from CBT to another form of therapy as your treatment needs change. This may mean meeting with a therapist by yourself or in a group. Therapy can also help you work through problems in your life, such as drug or alcohol dependence, that may be making your anxiety worse.  Getting better takes time  Therapy will help you feel better and teach you skills to help manage anxiety long term. But change doesn t happen right away. It takes a commitment from you. And treatment only works if you learn to face the causes of your anxiety. So, you might feel worse before you feel better. This can sometimes make it hard to stick with it. But remember: Therapy is a very effective treatment. The results will be well worth it.  Helping yourself  If anxiety is wearing you down, here are some things you can do to cope:    Check with your doctor and rule out any physical problems that may be causing the anxiety symptoms.    If an anxiety disorder is  diagnosed, seek mental healthcare. This is an illness and it can respond to treatment. Most types of anxiety disorders will respond to talk therapy and medicine.    Educate yourself about anxiety disorders. Keep track of helpful online resources and books you can use during stressful periods.    Try stress management techniques such as meditation.    Consider online or in-person support groups.    Don t fight your feelings. Anxiety feeds itself. The more you worry about it, the worse it gets. Instead, try to identify what might have triggered your anxiety. Then try to put this threat in perspective.    Keep in mind that you can t control everything about a situation. Change what you can and let the rest take its course.    Exercise -- it s a great way to relieve tension and help your body feel relaxed.    Examine your life for stress, and try to find ways to reduce it.    Avoid caffeine and nicotine, which can make anxiety symptoms worse.    Fight the temptation to turn to alcohol or unprescribed drugs for relief. They only make things worse in the long run.   Date Last Reviewed: 1/1/2017 2000-2017 [employer]. [address] [place] [address], [place] [address]. All rights reserved. This information is not intended as a substitute for professional medical care. Always follow your healthcare professional's instructions.

## 2019-08-16 NOTE — NURSING NOTE
Chief Complaint   Patient presents with     Medication Therapy Management     zoloft         Medication Reconciliation: complete    Tiffanie Iqbal, LPN

## 2019-08-16 NOTE — PROGRESS NOTES
Nursing Notes:   Tiffanie Iqbal LPN  8/16/2019  8:19 AM  Signed  Chief Complaint   Patient presents with     Medication Therapy Management     zoloft         Medication Reconciliation: complete    Tiffanie Iqbal LPN      HPI:    Stephanie Mcclelland is a 39 year old female who presents for medication recheck.  Patient was recently started on sertraline 50 mg daily for anxiety depression.  No side effects noted with the medication.  Tolerating the medication well.  She feels her symptoms have greatly improved.  Family has noticed that she has less anger concerns.  No down depressed thoughts.  Would like to stay in the same dose.  Needs a refill.  No suicidal or homicidal ideation.  Working on good diet.  Has noticed a decrease of weight with her current diet.    Past Medical History:   Diagnosis Date     Allergy status to unspecified drugs, medicaments and biological substances status     nfections- allergies     Anxiety disorder     Anxiety Disorder     Other specified postprocedural states     05/2005,Elective     Personal history of other medical treatment (CODE)     2005,Abn. pap 2005       Past Surgical History:   Procedure Laterality Date     ADENOIDECTOMY      child     MAMMOPLASTY AUGMENTATION      07/2012, Bilateral reduction mammoplasty and suction assisted lipectomy       Family History   Problem Relation Age of Onset     Other - See Comments Mother         Psychiatric illness,Bipolar     Cancer Father         Cancer,brain tumor     Unknown/Adopted Maternal Grandfather         Unknown     Other - See Comments Maternal Grandmother         Stroke,smoker     Heart Disease Maternal Grandmother         Heart Disease     Arthritis Paternal Grandmother         Arthritis     Heart Disease Paternal Grandfather         Heart Disease     Family History Negative Sister         Good Health     Family History Negative Brother         Good Health,unsure of where abouts due to mother lost children      "Family History Negative Sister         Good Health,unsure of where about due to mother lost children     Genetic Disorder Other         Genetic,FH Heart Disease-MGM,  age 57-FH Hypertension-mother-FH Thyroid Problems-mother-FH Depression-mother (bipolar), MGM-FH Ekuzvj-WUW-KC Ssurhzlesedv-KZN-MI Skin cancer-PGM     Genetic Disorder Other         Genetic,FH Heart Disease-MGM,  age 57-FH Hypertension-mother-FH Thyroid Problems-mother-FH Depression-mother (bipolar), MGM-FH Ajyvww-PGJ-DS Nraiasovqqhf-UPH-OM Skin cancer-PGM-Father - healthy-Siblings - healthy     Diabetes Paternal Aunt         Diabetes       Social History     Tobacco Use     Smoking status: Never Smoker     Smokeless tobacco: Never Used   Substance Use Topics     Alcohol use: Yes     Comment: Alcoholic Drinks/day: 1 drink/week       Current Outpatient Medications   Medication Sig Dispense Refill     calcium carbonate (TUMS) 500 MG chewable tablet        diphenhydrAMINE-acetaminophen (TYLENOL PM)  MG tablet        levonorgestrel (MIRENA) 20 MCG/24HR IUD        Magnesium Oxide 500 MG TABS        Melatonin 3 MG TBDP        sertraline (ZOLOFT) 50 MG tablet Take 1 tablet (50 mg) by mouth daily 90 tablet 3       Allergies   Allergen Reactions     Sulfa Drugs Hives and Nausea and Vomiting       REVIEW OFSYSTEMS:  Refer to HPI.    EXAM:   Vitals:    /62 (BP Location: Right arm, Patient Position: Sitting, Cuff Size: Adult Regular)   Pulse 76   Temp 97.3  F (36.3  C)   Resp 18   Ht 1.6 m (5' 3\")   Wt 79.3 kg (174 lb 12.8 oz)   BMI 30.96 kg/m    General appearance:appropriately dressed and well groomed  Attitude: cooperative  Behavior: normal  Eye Contact: normal  Speech: normal  Orientation: oriented to person, place, time and situation  Mood:  admits minimal sadness and anxiety  Affect: Mood Congruent  Thought Process: clear  Suicidal or Homicidal Ideation: reports no thoughts or intentions  Hallucination: no    PHQ Depression " Screen  PHQ-9 SCORE 1/29/2019 7/11/2019 8/16/2019   PHQ-9 Total Score 19 18 0       PEDRO Anxiety Screen  PEDRO-7 SCORE 1/29/2019 7/11/2019 8/16/2019   Total Score 19 21 4       ASSESSMENT AND PLAN:      ICD-10-CM    1. Severe anxiety F41.9 sertraline (ZOLOFT) 50 MG tablet   2. Severe depression (H) F32.2 sertraline (ZOLOFT) 50 MG tablet       Please take 1500 mg calcium and 3922-3872 units of vitamin D daily to keep your supplies up. Please have your vitamin D level rechecked in 3-6 months.      Anxiety and depression:  Continue sertraline as previously prescribed.  Encouraged good diet and exercise.   Encouraged to see a counselor.   Return in 1 year or sooner for recheck.   Return to clinic with change/worsening of symptoms.     Patient Instructions     Please take 1500 mg calcium and 2700-1635 units of vitamin D daily to keep your supplies up. Please have your vitamin D level rechecked in 3-6 months.      Continue sertraline  Encouraged good diet and exercise.   Encouraged to see a counselor.   Return in 1 year or sooner for recheck.   Return to clinic with change/worsening of symptoms.       Suicide Emergency First call for help:  [phone]    Depression: Tips to Help Yourself  As your healthcare providers help treat your depression, you can also help yourself. Keep in mind that your illness affects you emotionally, physically, mentally, and socially. So full recovery will take time. Take care of your body and your soul, and be patient with yourself as you get better.    Self-care    Educate yourself. Read about treatment and medicine options. If you have the energy, attend local conferences or support groups. Keep a list of useful websites and helpful books and use them as needed. This illness is not your fault. Don t blame yourself for your depression.    Manage early symptoms. If you notice symptoms returning, experience triggers, or identify other factors that may lead to a depressive  episode, get help as soon as possible. Ask trusted friends and family to monitor your behavior and let you know if they see anything of concern.    Work with your provider. Find a provider you can trust. Communicate honestly with that person and share information on your treatment for depression and your reaction to medicines.    Be prepared for a crisis. Know what to do if you experience a crisis. Keep the phone number of a crisis hotline and know the location of your community's urgent care centers and the closest emergency department.    Hold off on big decisions. Depression can cloud your judgment. So wait until you feel better before making major life decisions, such as changing jobs, moving, or getting  or .    Be patient. Recovering from depression is a process. Don t be discouraged if it takes some time to feel better.    Keep it simple. Depression saps your energy and concentration. So you won t be able to do all the things you used to do. Set small goals and do what you can.    Be with others. Don t isolate yourself--you ll only feel worse. Try to be with other people. And take part in fun activities when you can. Go to a movie, ballgame, Yaz[employer] service, or social event. Talk openly with people you can trust. And accept help when it s offered.  Take care of your body  People with depression often lose the desire to take care of themselves. That only makes their problems worse. During treatment and afterward, make a point to:    Exercise. It s a great way to take care of your body. And studies have shown that exercise helps fight depression.    Avoid drugs and alcohol. These may ease the pain in the short term. But they ll only make your problems worse in the long run.    Get relief from stress. Ask your healthcare provider for relaxation exercises and techniques to help relieve stress.    Eat right. A balanced and healthy diet helps keep your body healthy.  Date Last Reviewed: 1/1/2017     5820-9261 [employer]. [address] [place] [address], [place] [address]. All rights reserved. This information is not intended as a substitute for professional medical care. Always follow your healthcare professional's instructions.         Treating Anxiety Disorders with Therapy    If you have an anxiety disorder, you don t have to suffer anymore. Treatment is available. Therapy (also called counseling) is often a helpful treatment for anxiety disorders. With therapy, a specially trained professional (therapist) helps you face and learn to manage your anxiety. Therapy can be short-term or long-term depending on your needs. In some cases, medicine may also be prescribed with therapy. It may take time before you notice how much therapy is helping, but stick with it. With therapy, you can feel better.  Cognitive behavioral therapy (CBT)  Cognitive behavioral therapy (CBT) teaches you to manage anxiety. It does this by helping you understand how you think and act when you re anxious. Research has shown CBT to be a very effective treatment for anxiety disorders. How CBT is run is almost like a class. It involves homework and activities to build skills that teach you to cope with anxiety step by step. It can be done in a group or one-on-one, and often takes place for a set number of sessions. CBT has two main parts:    Cognitive therapy helps you identify the negative, irrational thoughts that occur with your anxiety. You ll learn to replace these with more positive, realistic thoughts.    Behavioral therapy helps you change how you react to anxiety. You ll learn coping skills and methods for relaxing to help you better deal with anxiety.  Other forms of therapy  Other therapy methods may work better for you than CBT. Or, you may move from CBT to another form of therapy as your treatment needs change. This may mean meeting with a therapist by yourself or in a group. Therapy can also help you work through problems  in your life, such as drug or alcohol dependence, that may be making your anxiety worse.  Getting better takes time  Therapy will help you feel better and teach you skills to help manage anxiety long term. But change doesn t happen right away. It takes a commitment from you. And treatment only works if you learn to face the causes of your anxiety. So, you might feel worse before you feel better. This can sometimes make it hard to stick with it. But remember: Therapy is a very effective treatment. The results will be well worth it.  Helping yourself  If anxiety is wearing you down, here are some things you can do to cope:    Check with your doctor and rule out any physical problems that may be causing the anxiety symptoms.    If an anxiety disorder is diagnosed, seek mental healthcare. This is an illness and it can respond to treatment. Most types of anxiety disorders will respond to talk therapy and medicine.    Educate yourself about anxiety disorders. Keep track of helpful online resources and books you can use during stressful periods.    Try stress management techniques such as meditation.    Consider online or in-person support groups.    Don t fight your feelings. Anxiety feeds itself. The more you worry about it, the worse it gets. Instead, try to identify what might have triggered your anxiety. Then try to put this threat in perspective.    Keep in mind that you can t control everything about a situation. Change what you can and let the rest take its course.    Exercise -- it s a great way to relieve tension and help your body feel relaxed.    Examine your life for stress, and try to find ways to reduce it.    Avoid caffeine and nicotine, which can make anxiety symptoms worse.    Fight the temptation to turn to alcohol or unprescribed drugs for relief. They only make things worse in the long run.   Date Last Reviewed: 1/1/2017 2000-2017 [employer]. [address] [facility], [place] [address].  All rights reserved. This information is not intended as a substitute for professional medical care. Always follow your healthcare professional's instructions.             Reviewed risks and benefits of medications and other treatment options.   Pt is advised to call if side effects to medications occur, especially if exaggerated/dramatic.    Mariaelena Gutierres PA-C..................8/16/2019 8:21 AM

## 2019-08-17 ASSESSMENT — ANXIETY QUESTIONNAIRES: GAD7 TOTAL SCORE: 4

## 2019-08-26 ENCOUNTER — MYC MEDICAL ADVICE (OUTPATIENT)
Dept: FAMILY MEDICINE | Facility: OTHER | Age: 39
End: 2019-08-26

## 2019-11-07 ENCOUNTER — OFFICE VISIT (OUTPATIENT)
Dept: FAMILY MEDICINE | Facility: OTHER | Age: 39
End: 2019-11-07
Attending: NURSE PRACTITIONER
Payer: COMMERCIAL

## 2019-11-07 VITALS
BODY MASS INDEX: 32.44 KG/M2 | SYSTOLIC BLOOD PRESSURE: 116 MMHG | HEIGHT: 62 IN | WEIGHT: 176.3 LBS | HEART RATE: 91 BPM | DIASTOLIC BLOOD PRESSURE: 82 MMHG | OXYGEN SATURATION: 99 % | RESPIRATION RATE: 20 BRPM | TEMPERATURE: 97.3 F

## 2019-11-07 DIAGNOSIS — J06.9 VIRAL URI WITH COUGH: Primary | ICD-10-CM

## 2019-11-07 PROCEDURE — 99213 OFFICE O/P EST LOW 20 MIN: CPT | Performed by: NURSE PRACTITIONER

## 2019-11-07 RX ORDER — CETIRIZINE HYDROCHLORIDE 10 MG/1
10 TABLET ORAL DAILY
COMMUNITY

## 2019-11-07 ASSESSMENT — PAIN SCALES - GENERAL: PAINLEVEL: MODERATE PAIN (5)

## 2019-11-07 ASSESSMENT — MIFFLIN-ST. JEOR: SCORE: 1432.94

## 2019-11-07 NOTE — NURSING NOTE
"Patient presents to clinic for cough since Friday. States coughing up green/yellow mucous, blood from left side of nose when she blows her nose, body aches Friday-Sunday, right ear clogged for 3 days. Cannot hear out of it and states moving to left ear. Has taken dayquil, Mucinex. States ringing and can hear heartbeat in ear.   Chief Complaint   Patient presents with     Ear Problem       Initial /82 (BP Location: Right arm, Patient Position: Sitting, Cuff Size: Adult Large)   Pulse 91   Temp 97.3  F (36.3  C) (Tympanic)   Resp 20   Ht 1.575 m (5' 2\")   Wt 80 kg (176 lb 4.8 oz)   SpO2 99%   Breastfeeding? No   BMI 32.25 kg/m   Estimated body mass index is 32.25 kg/m  as calculated from the following:    Height as of this encounter: 1.575 m (5' 2\").    Weight as of this encounter: 80 kg (176 lb 4.8 oz).  Medication Reconciliation: complete    Stephanie Cazares LPN    "

## 2019-11-07 NOTE — PROGRESS NOTES
Subjective     Stephanie Mcclelland is a 39 year old female who presents to clinic today for the following health issues:    HPI   ENT Symptoms             Symptoms: cc Present Absent Comment   Fever/Chills   x    Fatigue  x  Slept a lot over the weekend   Muscle Aches  x  Was present last Sunday, none since   Eye Irritation   x    Sneezing  x     Nasal Rik/Drg  x  greenish/yellow   Sinus Pressure/Pain   x    Loss of smell  x     Dental pain   x    Sore Throat  x  At the beginning, first symptom   Swollen Glands   x    Ear Pain/Fullness  x  R ear plugged, L may be starting now   Cough  x  Productive of greenish/yellow sputum   Wheeze  x     Chest Pain   x    Shortness of breath   x    Rash   x    Other   x      Symptom duration:  1 week   Symptom severity:  moderate   Treatments tried:  dayquil, severe congestion and cough (mucinex), peroxide in ear   Contacts:  mariia had similar symptoms and now improving       Patient Active Problem List   Diagnosis     Allergic rhinitis     Breast hypertrophy     Encounter for other general counseling or advice on contraception     Family history of psychiatric condition     Carbuncle and furuncle     Cervicitis and endocervicitis     Obesity     Past Surgical History:   Procedure Laterality Date     ADENOIDECTOMY      child     MAMMOPLASTY AUGMENTATION      07/2012, Bilateral reduction mammoplasty and suction assisted lipectomy       Social History     Tobacco Use     Smoking status: Never Smoker     Smokeless tobacco: Never Used   Substance Use Topics     Alcohol use: Yes     Comment: Alcoholic Drinks/day: 1 drink/week     Family History   Problem Relation Age of Onset     Other - See Comments Mother         Psychiatric illness,Bipolar     Cancer Father         Cancer,brain tumor     Unknown/Adopted Maternal Grandfather         Unknown     Other - See Comments Maternal Grandmother         Stroke,smoker     Heart Disease Maternal Grandmother         Heart Disease     Arthritis  "Paternal Grandmother         Arthritis     Heart Disease Paternal Grandfather         Heart Disease     Family History Negative Sister         Good Health     Family History Negative Brother         Good Health,unsure of where abouts due to mother lost children     Family History Negative Sister         Good Health,unsure of where about due to mother lost children     Genetic Disorder Other         Genetic,FH Heart Disease-MGM,  age 57-FH Hypertension-mother-FH Thyroid Problems-mother-FH Depression-mother (bipolar), MGM-FH Coyavd-GDT-PP Onybwyinnukv-UJR-KC Skin cancer-PGM     Genetic Disorder Other         Genetic,FH Heart Disease-MGM,  age 57-FH Hypertension-mother-FH Thyroid Problems-mother-FH Depression-mother (bipolar), MGM-FH Nimrjz-SBI-DX Ybbbjmaqczhc-OPR-BA Skin cancer-PGM-Father - healthy-Siblings - healthy     Diabetes Paternal Aunt         Diabetes         Current Outpatient Medications   Medication Sig Dispense Refill     calcium carbonate (TUMS) 500 MG chewable tablet        cholecalciferol (VITAMIN D3) 5000 units (125 mcg) capsule Take by mouth daily       diphenhydrAMINE-acetaminophen (TYLENOL PM)  MG tablet        levonorgestrel (MIRENA) 20 MCG/24HR IUD        Magnesium Oxide 500 MG TABS        Melatonin 3 MG TBDP        sertraline (ZOLOFT) 50 MG tablet Take 1 tablet (50 mg) by mouth daily 90 tablet 3     Allergies   Allergen Reactions     Sulfa Drugs Hives and Nausea and Vomiting       Reviewed and updated as needed this visit by Provider  Tobacco  Allergies  Meds  Problems  Med Hx  Surg Hx  Fam Hx  Soc Hx          Review of Systems   ROS COMP: As above      Objective    /82 (BP Location: Right arm, Patient Position: Sitting, Cuff Size: Adult Large)   Pulse 91   Temp 97.3  F (36.3  C) (Tympanic)   Resp 20   Ht 1.575 m (5' 2\")   Wt 80 kg (176 lb 4.8 oz)   SpO2 99%   Breastfeeding? No   BMI 32.25 kg/m    Body mass index is 32.25 kg/m .  Physical Exam   GENERAL: " healthy, alert and no distress  EYES: Eyes grossly normal to inspection, PERRL and conjunctivae and sclerae normal  HENT: normal cephalic/atraumatic, both ears: clear effusion and bulging membrane, nasal mucosa edematous , rhinorrhea clear, oral mucous membranes moist, sinuses: not tender and cobblestoning of posterior oropharynx  NECK: no adenopathy, no asymmetry, masses, or scars and thyroid normal to palpation  RESP: lungs clear to auscultation - no rales, rhonchi or wheezes  CV: regular rate and rhythm, normal S1 S2, no S3 or S4, no murmur, click or rub, no peripheral edema and peripheral pulses strong  MS: no gross musculoskeletal defects noted, no edema  SKIN: no suspicious lesions or rashes  NEURO: Normal strength and tone, mentation intact and speech normal  PSYCH: mentation appears normal, affect normal/bright    Diagnostic Test Results:  Labs reviewed in Epic  none         Assessment & Plan     1. Viral URI with cough  Symptomatic treatments recommended.  -Discussed that antibiotics would not help symptoms of viral URI. Education provided on symptoms of secondary bacterial infection such as new fever, chills, rigors, shortness of breath, increased work of breathing, that can occur with viral URI and need for further evaluation, if they occur.   - Ensure you are staying hydrated by drinking plenty of fluids or eating foods such as popsicles, jello, pudding.  - Honey and Salt water gurgles can help soothe sore throat  - Rest  - Humidifier can help with congestion and help keep mucus membranes such as throat and nose from drying out.  - Sleeping slightly propped up can help with congestion and postnasal drainage that can worsen cough at bedtime.  - As long as you have never been told to take Tylenol and/or Ibuprofen you can use them to manage fever and body aches per package instructions  Make sure you eat when you take ibuprofen to avoid stomach upset.  - OTC cough medications per package instructions to  help with cough. Check to see if the cough/cold medication already has acetaminophen (Tylenol) in it. If it does avoid taking additional Tylenol.  - If sudden onset of new fever, worsening symptoms return for further evaluation.  - OTC antihistamine such as Allegra, Zyrtec, Claritin (generic is okay) can help with nasal/sinus congestion and OTC nasal steroid such as Flonase can help decrease sinus inflammation to help with congestion.      Jenn Montoya NP  [facility] AND [facility]

## 2020-02-20 ENCOUNTER — MYC MEDICAL ADVICE (OUTPATIENT)
Dept: FAMILY MEDICINE | Facility: OTHER | Age: 40
End: 2020-02-20

## 2020-02-20 DIAGNOSIS — R10.32 LLQ ABDOMINAL PAIN: Primary | ICD-10-CM

## 2020-02-20 DIAGNOSIS — N94.10 PAIN IN FEMALE GENITALIA ON INTERCOURSE: ICD-10-CM

## 2020-02-25 ENCOUNTER — HOSPITAL ENCOUNTER (OUTPATIENT)
Dept: ULTRASOUND IMAGING | Facility: OTHER | Age: 40
Discharge: HOME | End: 2020-02-25
Attending: PHYSICIAN ASSISTANT | Admitting: PHYSICIAN ASSISTANT
Payer: COMMERCIAL

## 2020-02-25 DIAGNOSIS — R10.32 LLQ ABDOMINAL PAIN: ICD-10-CM

## 2020-02-25 DIAGNOSIS — N94.10 PAIN IN FEMALE GENITALIA ON INTERCOURSE: ICD-10-CM

## 2020-02-25 PROCEDURE — 76830 TRANSVAGINAL US NON-OB: CPT

## 2020-03-03 ENCOUNTER — OFFICE VISIT (OUTPATIENT)
Dept: OBGYN | Facility: OTHER | Age: 40
End: 2020-03-03
Attending: PHYSICIAN ASSISTANT
Payer: COMMERCIAL

## 2020-03-03 VITALS
HEART RATE: 76 BPM | BODY MASS INDEX: 33.84 KG/M2 | DIASTOLIC BLOOD PRESSURE: 60 MMHG | WEIGHT: 185 LBS | SYSTOLIC BLOOD PRESSURE: 102 MMHG

## 2020-03-03 DIAGNOSIS — R10.32 LLQ ABDOMINAL PAIN: ICD-10-CM

## 2020-03-03 DIAGNOSIS — N94.10 PAIN IN FEMALE GENITALIA ON INTERCOURSE: Primary | ICD-10-CM

## 2020-03-03 LAB
C TRACH DNA SPEC QL PROBE+SIG AMP: NOT DETECTED
N GONORRHOEA DNA SPEC QL PROBE+SIG AMP: NOT DETECTED
SPECIMEN SOURCE: NORMAL

## 2020-03-03 PROCEDURE — 99203 OFFICE O/P NEW LOW 30 MIN: CPT | Performed by: OBSTETRICS & GYNECOLOGY

## 2020-03-03 PROCEDURE — 87491 CHLMYD TRACH DNA AMP PROBE: CPT | Mod: ZL | Performed by: OBSTETRICS & GYNECOLOGY

## 2020-03-03 PROCEDURE — 87591 N.GONORRHOEAE DNA AMP PROB: CPT | Mod: ZL | Performed by: OBSTETRICS & GYNECOLOGY

## 2020-03-03 ASSESSMENT — PAIN SCALES - GENERAL: PAINLEVEL: NO PAIN (0)

## 2020-03-03 NOTE — PROGRESS NOTES
Gynecology Visit    CC: LLQ pain    HPI:    Stephanie Mcclelland is a 39 year old , here for six months of LLQ pain. It is a sharp pain. It occurs only with intercourse. It is always in the same spot. It stops immediately when done having intercourse. It is not positional. No post-coital bleeding. She has chronic constipation but is managed with magnesium and has daily BMs. She has a Mirena x1 year and has monthly spotting. Pain is not associated with time in her menstrual cycle. Her libido has decreased but she isn't sure if it is because of the pain or because she started zoloft about 6 months ago as well. Her mood is good on the zoloft.       OBHx  OB History    Para Term  AB Living   1 0 0 0 1 0   SAB TAB Ectopic Multiple Live Births   1 0 0 0 0      # Outcome Date GA Lbr Mikel/2nd Weight Sex Delivery Anes PTL Lv   1 SAB                PMHx:   Past Medical History:   Diagnosis Date     Allergy status to unspecified drugs, medicaments and biological substances status     nfections- allergies     Anxiety disorder     Anxiety Disorder     Other specified postprocedural states     2005,Elective     Personal history of other medical treatment (CODE)     ,Abn. pap       PSHx:   Past Surgical History:   Procedure Laterality Date     ADENOIDECTOMY      child     MAMMOPLASTY AUGMENTATION      2012, Bilateral reduction mammoplasty and suction assisted lipectomy      Meds:   Current Outpatient Medications   Medication     calcium carbonate (TUMS) 500 MG chewable tablet     cetirizine (ZYRTEC) 10 MG tablet     cholecalciferol (VITAMIN D3) 5000 units (125 mcg) capsule     diphenhydrAMINE-acetaminophen (TYLENOL PM)  MG tablet     levonorgestrel (MIRENA) 20 MCG/24HR IUD     Magnesium Oxide 500 MG TABS     Melatonin 3 MG TBDP     sertraline (ZOLOFT) 50 MG tablet     No current facility-administered medications for this visit.      Allergies:       Allergies   Allergen Reactions     Sulfa Drugs  Hives and Nausea and Vomiting       SocHx:   Social History     Tobacco Use     Smoking status: Never Smoker     Smokeless tobacco: Never Used   Substance Use Topics     Alcohol use: Yes     Comment: Alcoholic Drinks/day: 1 drink/week     Drug use: Never     Comment: Drug use: No     Lives with her father. Her fiance comes up on the weekends. Works as the  of [employer]    FamHx:   Family History   Problem Relation Age of Onset     Other - See Comments Mother         Psychiatric illness,Bipolar     Cancer Father         Cancer,brain tumor     Unknown/Adopted Maternal Grandfather         Unknown     Other - See Comments Maternal Grandmother         Stroke,smoker     Heart Disease Maternal Grandmother         Heart Disease     Arthritis Paternal Grandmother         Arthritis     Heart Disease Paternal Grandfather         Heart Disease     Family History Negative Sister         Good Health     Family History Negative Brother         Good Health,unsure of where abouts due to mother lost children     Family History Negative Sister         Good Health,unsure of where about due to mother lost children     Genetic Disorder Other         Genetic,FH Heart Disease-MGM,  age 57-FH Hypertension-mother-FH Thyroid Problems-mother-FH Depression-mother (bipolar), MGM-FH Wbpfoz-DVX-AY Otgoqbbinlld-SWZ-DB Skin cancer-PGM     Genetic Disorder Other         Genetic,FH Heart Disease-MGM,  age 57-FH Hypertension-mother-FH Thyroid Problems-mother-FH Depression-mother (bipolar), MGM-FH Yxcusi-ZAV-BB Aczvvevyxxsl-JVM-FU Skin cancer-PGM-Father - healthy-Siblings - healthy     Diabetes Paternal Aunt         Diabetes        Physical Exam  /60 (BP Location: Right arm, Patient Position: Sitting, Cuff Size: Adult Large)   Pulse 76   Wt 83.9 kg (185 lb)   Breastfeeding No   BMI 33.84 kg/m    Gen: Well-appearing, NAD  Resp: nonlabored  Psych: appropriate mood and affect    Pelvic:  Normal appearing external female  genitalia. Normal hair distribution. Vagina is without lesions. Moderate white discharge. Cervical nabothian cyst at 12 o'clock.  No other lesions, no cervical motion tenderness. Uterus is small, mobile, non-tender, anteverted. No adnexal tenderness or masses. No tenderness with palpation of pelvic floor, urethra, bladder, cervix, uterus, bilateral adnexa.     /Chlam collected    Pelvic US:  MEASUREMENTS:  Uterus: 8.8 x 4.0 x 4.8 (Length x Height x Width)  Right Ovary: 3.2 x 3.0 x 2.3 (Length x Height x Width)  Left Ovary:  3.7 x 2.5 x 2.1 (Length x Height x Width)  UTERUS: The uterus is anteverted. No myometrial mass is identified.  The endometrium is thin and mostly obscured by a centrally located  IUD.  ADNEXA: The ovaries are normal in volume with preserved color Doppler  flow and multiple small follicles. A possible corpus luteum is seen on  the right.  MISC: No free fluid is seen.                                                IMPRESSION: Centrally located IUD.    Assessment/Plan  Stephanie Mcclelland is a 39 year old  female here for dyspareunia x6 months. No reproducible tenderness on exam. No abnormalities on ultrasound or exam. Recommend to patient to keep a pain diary to see if there is any association with her menstrual cycle, constipation, or other symptoms. She can return to clinic as needed.     30 minutes were spent with the patient with >50% spent counseling on dyspareunia, pelvic pain.      Brandee Azar MD  OB/GYN  3/3/2020 1:52 PM

## 2020-03-03 NOTE — NURSING NOTE
Chief Complaint   Patient presents with     Consult     LLQ pain Dysparunia      Pain with intercourse X 6 months.     Medication Reconciliation: completed   Annette Atkinson LPN  3/3/2020 1:33 PM

## 2020-03-11 ENCOUNTER — HEALTH MAINTENANCE LETTER (OUTPATIENT)
Age: 40
End: 2020-03-11

## 2020-08-21 DIAGNOSIS — F41.9 SEVERE ANXIETY: ICD-10-CM

## 2020-08-21 DIAGNOSIS — F32.2 SEVERE DEPRESSION (H): ICD-10-CM

## 2020-08-21 NOTE — LETTER
August 24, 2020      Stephanie Mcclelland  [address] [place] [address]        Dear Setphanie,         A refill of sertraline (ZOLOFT) has been requested by your pharmacy and we noticed that you are overdue for an annual exam.      This refill request has been sent to a provider for consideration at this time.    Your health is very important to us.  Please call the clinic at [phone] to schedule your appointment.    Thank you for choosing [facility] and [facility] for your health care needs.    Sincerely,    Refill RN  [facility]

## 2020-08-24 NOTE — TELEPHONE ENCOUNTER
[facility] sent Rx request for the following:   sertraline (ZOLOFT) 50 MG tablet   Sig: Take 1 tablet (50 mg) by mouth daily - Oral     Last Prescription Date:   08/16/2019  Last Fill Qty/Refills:         90, R-3    Last Office Visit:              08/16/2019   Future Office visit:           none  Routing refill request to provider for review/approval because:  SSRIs Protocol Failed  PHQ-9 score less than 5 in past 6 months   Recent (6 mo) or future (30 days) visit within the authorizing provider's specialty         Unable to complete prescription refill per RN Medication Refill Policy.Pt is due for medication management appt.  Will send appt reminder letter, add note to Rx, carie up meds, and route to provider for consideration.      ................... Tequila Ruth RN ....................  8/24/2020   8:39 AM

## 2020-09-01 ENCOUNTER — MYC REFILL (OUTPATIENT)
Dept: FAMILY MEDICINE | Facility: OTHER | Age: 40
End: 2020-09-01

## 2020-09-01 DIAGNOSIS — F41.9 SEVERE ANXIETY: ICD-10-CM

## 2020-09-01 DIAGNOSIS — F32.2 SEVERE DEPRESSION (H): ICD-10-CM

## 2020-09-03 ENCOUNTER — MYC REFILL (OUTPATIENT)
Dept: FAMILY MEDICINE | Facility: OTHER | Age: 40
End: 2020-09-03

## 2020-09-03 DIAGNOSIS — F41.9 SEVERE ANXIETY: ICD-10-CM

## 2020-09-03 DIAGNOSIS — F32.2 SEVERE DEPRESSION (H): ICD-10-CM

## 2020-09-03 NOTE — TELEPHONE ENCOUNTER
"[employer] Message from patient sent Rx request for the following:   sertraline (ZOLOFT) 50 MG tablet  Sig:Take 1 tablet (50 mg) by mouth daily    Last Prescription Date:   08/24/2020  Last Fill Qty/Refills:         90, R-0    Last Office Visit:              11/07/2019 ([place])   Future Office visit:           None noted    Routing refill request to provider for review/approval because:   SSRIs Protocol Failed -         Failed - PHQ-9 score less than 5 in past 6 months     Please review last PHQ-9 score.           Failed - Recent (6 mo) or future (30 days) visit within the authorizing provider's specialty     Patient had office visit in the last 6 months or has a visit in the next 30 days with authorizing provider or within the authorizing provider's specialty.  See \"Patient Info\" tab in inbasket, or \"Choose Columns\" in Meds & Orders section of the refill encounter.          Disp  Refills  Start  End  DELTA    sertraline (ZOLOFT) 50 MG tablet  90 tablet  0  8/24/2020   No    Sig: TAKE 1 TABLET BY MOUTH  DAILY    Sent to pharmacy as: Sertraline HCl 50 MG Oral Tablet (ZOLOFT)    Class: E-Prescribe    Notes to Pharmacy: Pt due for medication management appt.  Please advise Pt to contact [facility] for assistance in scheduling appt for further refills.  Thank you!    Order: [id number]    E-Prescribing Status: Receipt confirmed by pharmacy (8/24/2020  8:56 AM CDT)      Patient will be in need of medication review. Previous encounter letter sent, response sent via [employer] to patient.   Redundant refill request refused: Too soon:  Unable to complete prescription refill per RN Medication Refill Policy.................... Tonie Heaton RN ....................  9/3/2020   10:31 AM          "

## 2020-09-04 NOTE — TELEPHONE ENCOUNTER
Call to pharmacy regarding Rx status, verified Rx was received from date 08/24/2020 for #90 R-0 and in process currently.  Call to patient regarding Rx status, patient verbalized understanding.   Request denied at this time: duplicate.  Unable to complete prescription refill per RN Medication Refill Policy.................... Tonie Heaton RN ....................  9/4/2020   3:01 PM

## 2020-11-10 DIAGNOSIS — F41.9 SEVERE ANXIETY: ICD-10-CM

## 2020-11-10 DIAGNOSIS — F32.2 SEVERE DEPRESSION (H): ICD-10-CM

## 2020-11-10 NOTE — LETTER
November 12, 2020      Stephanie Mcclelland  [address] [place] [address]        Dear Stephanie,       This letter is to remind you that you are due for your annual exam. No primary care provider on file. Your last comprehensive visit was more than 12 months ago.    A refill request has been sent to a provider for Zoloft.  Additional refills require you to have a Primary Care Provider and a completed annual appointment.    Please call the clinic at [phone] to schedule your appointment.      If you are no longer seeing receiving your primary care at [facility], please call to let us know.      Thank you for choosing [facility] and [facility] for your health care needs.    Sincerely,    Refill ROD  [facility]

## 2020-11-12 NOTE — TELEPHONE ENCOUNTER
" Disp Refills Start End DELTA   sertraline (ZOLOFT) 50 MG tablet 90 tablet 0 8/24/2020  No   Sig: TAKE 1 TABLET BY MOUTH  DAILY       LOV: 8/16/2019  Future Office visit: No future appointment scheduled at this time.   Attempted to contact patient. Left message. Sent reminder letter.     Pharmacy note:\"Requesting 1 year supply\"    Routing refill request to provider for review/approval because:  Failed protocol    Requested Prescriptions   Pending Prescriptions Disp Refills     sertraline (ZOLOFT) 50 MG tablet [Pharmacy Med Name: SERTRALINE HCL 50MG TABLET] 90 tablet 3     Sig: TAKE 1 TABLET BY MOUTH  DAILY       SSRIs Protocol Failed - 11/10/2020  9:16 PM        Failed - PHQ-9 score less than 5 in past 6 months     Please review last PHQ-9 score.   8/16/2019 score 0        Failed - Recent (6 mo) or future (30 days) visit within the authorizing provider's specialty     Patient had office visit in the last 6 months or has a visit in the next 30 days with authorizing provider or within the authorizing provider's specialty.  See \"Patient Info\" tab in inbasket, or \"Choose Columns\" in Meds & Orders section of the refill encounter.            Passed - Medication is active on med list        Passed - Patient is age 18 or older        Passed - No active pregnancy on record        Passed - No positive pregnancy test in last 12 months         Unable to complete prescription refill per RN Medication Refill Policy.................... Nayely Porter RN ....................  11/12/2020   9:45 AM        "

## 2021-01-03 ENCOUNTER — HEALTH MAINTENANCE LETTER (OUTPATIENT)
Age: 41
End: 2021-01-03

## 2021-04-25 ENCOUNTER — HEALTH MAINTENANCE LETTER (OUTPATIENT)
Age: 41
End: 2021-04-25

## 2021-06-10 ENCOUNTER — OFFICE VISIT (OUTPATIENT)
Dept: FAMILY MEDICINE | Facility: OTHER | Age: 41
End: 2021-06-10
Attending: PHYSICIAN ASSISTANT
Payer: COMMERCIAL

## 2021-06-10 VITALS
BODY MASS INDEX: 35.26 KG/M2 | TEMPERATURE: 98.6 F | HEIGHT: 63 IN | WEIGHT: 199 LBS | SYSTOLIC BLOOD PRESSURE: 124 MMHG | OXYGEN SATURATION: 99 % | HEART RATE: 85 BPM | DIASTOLIC BLOOD PRESSURE: 70 MMHG | RESPIRATION RATE: 20 BRPM

## 2021-06-10 DIAGNOSIS — B37.9 ANTIBIOTIC-INDUCED YEAST INFECTION: ICD-10-CM

## 2021-06-10 DIAGNOSIS — T36.95XA ANTIBIOTIC-INDUCED YEAST INFECTION: ICD-10-CM

## 2021-06-10 DIAGNOSIS — J32.0 CHRONIC MAXILLARY SINUSITIS: Primary | ICD-10-CM

## 2021-06-10 PROCEDURE — 99213 OFFICE O/P EST LOW 20 MIN: CPT | Performed by: PHYSICIAN ASSISTANT

## 2021-06-10 RX ORDER — FLUCONAZOLE 150 MG/1
150 TABLET ORAL ONCE
Qty: 1 TABLET | Refills: 0 | Status: SHIPPED | OUTPATIENT
Start: 2021-06-10 | End: 2021-06-10

## 2021-06-10 ASSESSMENT — PAIN SCALES - GENERAL: PAINLEVEL: NO PAIN (0)

## 2021-06-10 ASSESSMENT — MIFFLIN-ST. JEOR: SCORE: 1542.82

## 2021-06-10 NOTE — PROGRESS NOTES
ASSESSMENT/PLAN:    I have reviewed the nursing notes.  I have reviewed the findings, diagnosis, plan and need for follow up with the patient.    (J32.0) Chronic maxillary sinusitis  (primary encounter diagnosis)  (B37.9,  T36.95XA) Antibiotic-induced yeast infection  Comment: see below  Plan: fluconazole (DIFLUCAN) 150 MG tablet, amoxicillin-clavulanate (AUGMENTIN) 875-125 MG Tablet  Vital stable. PE consistent with chronic sinusitis > 10 days duration overall. Sent through RX for Augmentin PO BID x 10 days. Will send through Diflucan  mg once due to history of ABX induced yeast infections. Symptomatic treatment - Encouraged fluids, salt water gargles, honey, elevation, humidifier, sinus rinse/netti pot, lozenges, tea, topical vapor rub, popsicles, rest, etc. OTC antihistamines as needed for symptom control. May use over-the-counter Tylenol or ibuprofen PRN. Patient is in agreement and understanding of the above treatment plan. All questions and concerns were addressed and answered to patient's satisfaction. AVS reviewed with patient.     Discussed warning signs/symptoms indicative of need to f/u    Follow up if symptoms persist or worsen or concerns    I explained my diagnostic considerations and recommendations to the patient, who voiced understanding and agreement with the treatment plan. All questions were answered. We discussed potential side effects of any prescribed or recommended therapies, as well as expectations for response to treatments.    Yamilet Barrett PA-C  6/10/2021  5:14 PM    HPI:    Stephanie Mcclelland is a 40 year old female  who presents to [facility] today for concerns of sinus pain/pressure, headache, otalgia, sore throat (due to post nasal drip and cough), feels like symptoms have moved into chest. She has tried OTC sinus medication today at 2 pm without relief.  No history of sinus infections.     Current symptoms present, x 9+ days    Symptoms:  Pharyngitis: Yes  Congestion:  Yes  Cough/Productive Cough: Yes - productive mucus  Post Nasal Drip: Yes  Coryza: No  Headache: Yes  Fevers, Chills: No  Sneezing/Allergy Symptoms: Yes  Sinus Pain/Pressure: Yes  Myalgias: No  Otalgia: Yes - right worse than left  Activity Level Changes: No  Appetite/Liquid Intake Changes: No  Changes to Bowel Habits: No  Changes to Bladder Habits: No  Additional Symptoms to Report: No  History of similar symptoms: No  Prior workup: No    Treatments tried: Decongestants, Fluids and Rest    Site of exposure: not known.  Type of exposure: not known    Cardiopulmonary History:  Recent Infections (Pneumonia, etc): No  Smoker: No  Asthma: No  COPD: No  Additional History: No  Cystic Fibrosis: No  Cardiac History Including Stroke/Stent Placement/STEMI/STEMI, etc.: No    Other Pertinent History: still has tonsils, adenoids removed    PCP: None    Past Medical History:   Diagnosis Date     Allergy status to unspecified drugs, medicaments and biological substances status     nfections- allergies     Anxiety disorder     Anxiety Disorder     Other specified postprocedural states     05/2005,Elective     Personal history of other medical treatment (CODE)     2005,Abn. pap 2005     Past Surgical History:   Procedure Laterality Date     ADENOIDECTOMY      child     MAMMOPLASTY AUGMENTATION      07/2012, Bilateral reduction mammoplasty and suction assisted lipectomy     Social History     Tobacco Use     Smoking status: Never Smoker     Smokeless tobacco: Never Used   Substance Use Topics     Alcohol use: Yes     Comment: Alcoholic Drinks/day: 1 drink/week     Current Outpatient Medications   Medication Sig Dispense Refill     amoxicillin-clavulanate (AUGMENTIN) 875-125 MG tablet Take 1 tablet by mouth 2 times daily for 10 days 20 tablet 0     calcium carbonate (TUMS) 500 MG chewable tablet        cetirizine (ZYRTEC) 10 MG tablet Take 10 mg by mouth daily       cholecalciferol (VITAMIN D3) 5000 units (125 mcg) capsule Take by  "mouth daily       diphenhydrAMINE-acetaminophen (TYLENOL PM)  MG tablet        fluconazole (DIFLUCAN) 150 MG tablet Take 1 tablet (150 mg) by mouth once for 1 dose 1 tablet 0     levonorgestrel (MIRENA) 20 MCG/24HR IUD        Magnesium Oxide 500 MG TABS        Melatonin 3 MG TBDP        sertraline (ZOLOFT) 50 MG tablet TAKE 1 TABLET BY MOUTH  DAILY 90 tablet 3     Allergies   Allergen Reactions     Sulfa Drugs Hives and Nausea and Vomiting     Past medical history, past surgical history, current medications and allergies reviewed and accurate to the best of my knowledge.      ROS:  Refer to HPI    /70   Pulse 85   Temp 98.6  F (37  C) (Tympanic)   Resp 20   Ht 1.594 m (5' 2.75\")   Wt 90.3 kg (199 lb)   SpO2 99%   BMI 35.53 kg/m      EXAM:  General Appearance: Well appearing 40-year old female, appropriate appearance for age. No acute distress  Ears: Left TM intact, translucent with bony landmarks appreciated, no erythema, no effusion, no bulging, no purulence.  Right TM intact, translucent with bony landmarks appreciated, no erythema, no effusion, no bulging, no purulence.  Left auditory canal clear.  Right auditory canal clear.  Normal external ears, non tender.  Eyes: conjunctivae normal without erythema or irritation, corneas clear, no drainage or crusting, no eyelid swelling, pupils equal   Orophayrnx: moist mucous membranes, posterior pharynx without erythema, tonsils without hypertrophy, no erythema, no exudates or petechiae, no post nasal drip seen, no trismus, voice clear.    Sinuses:  Bilateral frontal sinus tenderness, none over maxillary  Nose:  Bilateral nares: no erythema, no edema, no drainage or congestion   Neck: supple without adenopathy  Respiratory: normal chest wall and respirations.  Normal effort.  Clear to auscultation bilaterally, no wheezing, crackles or rhonchi.  No increased work of breathing.  No cough appreciated.  Cardiac: RRR with no murmurs  Dermatological: no " rashes noted of exposed skin  Psychological: normal affect, alert, oriented, and pleasant.     Labs:  None    Xray:  None

## 2021-06-10 NOTE — PATIENT INSTRUCTIONS
Please refer to your AVS for follow up and pain/symptoms management recommendations (I.e.: medications, helpful conservative treatment modalities, appropriate follow up if need to a specialist or family practice, etc.). Please return to urgent care if your symptoms change or worsen.     Discharge instructions:  -If you were prescribed a medication(s), please take this as prescribed/directed  -Monitor your symptoms, if changing/worsening, return to UC/ER or PCP for follow up    Sinus Infection:   1. Dry out congestion with flonase (1spray in both nostrils 2x daily for 3-5 days) and pseudoephedrine (1-2 tabs every 4-6 hrs for 3-5 days) unless contraindicated     2. Use a saline spray/Neti Pot/sinus flush (Braeden Med Sinus Rinse) 2-3 times daily to irrigate sinuses/mucosal tissue. This dilutes and moves secretions.     3. Tylenol or ibuprofen for pain and fevers - alternate every 4 hours as needed. I.e.: Ibuprofen at 8am, Tylenol 12pm, Ibuprofen 4pm    -Daily maximum of Tylenol is 4000mg (recommend staying under 3000mg)   -Daily maximum of Ibuprofen is 1200mg (take no more than six 200mg pills a day)    4. Plenty of fluids and rest as needed.     5. Chew, yawn and speak to help eustachian tubes drain.     * If you are a smoker, try to quit *     - Consider the following over-the-counter products if you are older than 1 year and not pregnant: honey/chestal for cough relief and sambucus/elderberry for viral upper-respiratory symptoms.    You were prescribed an antibiotic, please take into consideration the following information:  - Take entire course of antibiotic even if you start to feel better.  - Antibiotics can cause stomach upset including nausea and diarrhea. Read your bottle or ask the pharmacist if antibiotic can be taken with food to help prevent nausea. If you have symptoms of diarrhea you can take an over-the-counter probiotic and/or increase foods with probiotics such as yogurt, [place], sauerkraut.  -Use  caution in sunlight as can lead to increased risk of sunburn while on ABX (antibiotics).

## 2021-06-10 NOTE — NURSING NOTE
"Chief Complaint   Patient presents with     Sinus Problem     Patient is here for a cough that started about 4 days ago, along with a sore throat and headache that started 8 days ago.     Initial /70   Pulse 85   Temp 98.6  F (37  C) (Tympanic)   Resp 20   Ht 1.594 m (5' 2.75\")   Wt 90.3 kg (199 lb)   SpO2 99%   BMI 35.53 kg/m   Estimated body mass index is 35.53 kg/m  as calculated from the following:    Height as of this encounter: 1.594 m (5' 2.75\").    Weight as of this encounter: 90.3 kg (199 lb).  Medication Reconciliation: complete    Maliha Irving LPN  "

## 2021-06-15 ENCOUNTER — MYC MEDICAL ADVICE (OUTPATIENT)
Dept: FAMILY MEDICINE | Facility: OTHER | Age: 41
End: 2021-06-15

## 2021-06-24 ENCOUNTER — OFFICE VISIT (OUTPATIENT)
Dept: FAMILY MEDICINE | Facility: OTHER | Age: 41
End: 2021-06-24
Attending: PHYSICIAN ASSISTANT
Payer: COMMERCIAL

## 2021-06-24 VITALS
RESPIRATION RATE: 16 BRPM | WEIGHT: 200 LBS | DIASTOLIC BLOOD PRESSURE: 70 MMHG | SYSTOLIC BLOOD PRESSURE: 106 MMHG | BODY MASS INDEX: 35.71 KG/M2 | HEART RATE: 78 BPM | TEMPERATURE: 97.4 F

## 2021-06-24 DIAGNOSIS — F41.9 SEVERE ANXIETY: Primary | ICD-10-CM

## 2021-06-24 DIAGNOSIS — F32.2 SEVERE DEPRESSION (H): ICD-10-CM

## 2021-06-24 PROCEDURE — 99213 OFFICE O/P EST LOW 20 MIN: CPT | Performed by: PHYSICIAN ASSISTANT

## 2021-06-24 RX ORDER — BUPROPION HYDROCHLORIDE 150 MG/1
150 TABLET ORAL EVERY MORNING
Qty: 90 TABLET | Refills: 0 | Status: SHIPPED | OUTPATIENT
Start: 2021-06-24 | End: 2022-12-05

## 2021-06-24 ASSESSMENT — ANXIETY QUESTIONNAIRES
2. NOT BEING ABLE TO STOP OR CONTROL WORRYING: NOT AT ALL
GAD7 TOTAL SCORE: 0
1. FEELING NERVOUS, ANXIOUS, OR ON EDGE: NOT AT ALL
3. WORRYING TOO MUCH ABOUT DIFFERENT THINGS: NOT AT ALL
5. BEING SO RESTLESS THAT IT IS HARD TO SIT STILL: NOT AT ALL
6. BECOMING EASILY ANNOYED OR IRRITABLE: NOT AT ALL
7. FEELING AFRAID AS IF SOMETHING AWFUL MIGHT HAPPEN: NOT AT ALL

## 2021-06-24 ASSESSMENT — PATIENT HEALTH QUESTIONNAIRE - PHQ9
SUM OF ALL RESPONSES TO PHQ QUESTIONS 1-9: 5
5. POOR APPETITE OR OVEREATING: NOT AT ALL

## 2021-06-24 NOTE — PROGRESS NOTES
Nursing Notes:   Tiffanie Iqbal LPN  2021  9:38 AM  Signed  Chief Complaint   Patient presents with     Medication Therapy Management         Medication Reconciliation: complete    Tiffanie Iqbal LPN        HPI:    Stephanie Mcclelland is a 40 year old female who presents for med check.   No sex drive.  Patient is concerned that she is having side effects from her medication.  Currently has a low energy level.  Stress at work.  Currently on the medication for 2 years.  Wondering about switching to a different type.  No suicidal or homicidal ideation.    Past Medical History:   Diagnosis Date     Allergy status to unspecified drugs, medicaments and biological substances status     nfections- allergies     Anxiety disorder     Anxiety Disorder     Other specified postprocedural states     2005,Elective     Personal history of other medical treatment (CODE)     ,Abn. pap        Past Surgical History:   Procedure Laterality Date     ADENOIDECTOMY      child     MAMMOPLASTY AUGMENTATION      2012, Bilateral reduction mammoplasty and suction assisted lipectomy       Family History   Problem Relation Age of Onset     Other - See Comments Mother         Psychiatric illness,Bipolar     Cancer Father         Cancer,brain tumor     Unknown/Adopted Maternal Grandfather         Unknown     Other - See Comments Maternal Grandmother         Stroke,smoker     Heart Disease Maternal Grandmother         Heart Disease     Arthritis Paternal Grandmother         Arthritis     Heart Disease Paternal Grandfather         Heart Disease     Family History Negative Sister         Good Health     Family History Negative Brother         Good Health,unsure of where abouts due to mother lost children     Family History Negative Sister         Good Health,unsure of where about due to mother lost children     Genetic Disorder Other         Genetic,FH Heart Disease-MGM,  age 57-FH Hypertension-mother-FH Thyroid  Problems-mother-FH Depression-mother (bipolar), MGM-FH Yznlyd-DLD-FM Dmvjwsymcfmv-AYF-ES Skin cancer-PGM     Genetic Disorder Other         Genetic,FH Heart Disease-MGM,  age 57-FH Hypertension-mother-FH Thyroid Problems-mother-FH Depression-mother (bipolar), MGM-FH Qeatnp-MUR-AC Fgzvynnupqaj-PGK-IL Skin cancer-PGM-Father - healthy-Siblings - healthy     Diabetes Paternal Aunt         Diabetes       Social History     Tobacco Use     Smoking status: Never Smoker     Smokeless tobacco: Never Used   Substance Use Topics     Alcohol use: Yes     Comment: Alcoholic Drinks/day: 1 drink/week       Current Outpatient Medications   Medication Sig Dispense Refill     buPROPion (WELLBUTRIN XL) 150 MG 24 hr tablet Take 1 tablet (150 mg) by mouth every morning 90 tablet 0     calcium carbonate (TUMS) 500 MG chewable tablet        cetirizine (ZYRTEC) 10 MG tablet Take 10 mg by mouth daily       cholecalciferol (VITAMIN D3) 5000 units (125 mcg) capsule Take by mouth daily       diphenhydrAMINE-acetaminophen (TYLENOL PM)  MG tablet        levonorgestrel (MIRENA) 20 MCG/24HR IUD        Magnesium Oxide 500 MG TABS        Melatonin 3 MG TBDP          Allergies   Allergen Reactions     Sulfa Drugs Hives and Nausea and Vomiting       REVIEW OFSYSTEMS:  Refer to HPI.    EXAM:   Vitals:    /70 (BP Location: Right arm, Patient Position: Sitting, Cuff Size: Adult Large)   Pulse 78   Temp 97.4  F (36.3  C)   Resp 16   Wt 90.7 kg (200 lb)   BMI 35.71 kg/m    General appearance:appropriately dressed and well groomed  Attitude: cooperative  Behavior: normal  Eye Contact: normal  Speech: normal  Orientation: oriented to person, place, time and situation  Mood:  admits mild sadness and anxiety  Affect: Mood Congruent  Thought Process: clear  Suicidal or Homicidal Ideation: reports no thoughts or intentions  Hallucination: no    PHQ Depression Screen  PHQ-9 SCORE 2019   PHQ-9 Total Score 18 0 5        PEDRO Anxiety Screen  PEDRO-7 SCORE 7/11/2019 8/16/2019 6/24/2021   Total Score 21 4 0       ASSESSMENT AND PLAN:      ICD-10-CM    1. Severe anxiety  F41.9 buPROPion (WELLBUTRIN XL) 150 MG 24 hr tablet   2. Severe depression (H)  F32.2 buPROPion (WELLBUTRIN XL) 150 MG 24 hr tablet       Started on wellbutrin 150 mg.  Gave side effect profile.    Weane off sertraline - 1 tab every other day for 1 week, then stop.     Encouraged good diet and exercise.   Encouraged to see a counselor.   Return in 4-6 weeks for recheck.   Return to clinic with change/worsening of symptoms.     Reviewed risks and benefits of medications and other treatment options.   Pt is advised to call if side effects to medications occur, especially if exaggerated/dramatic.      Patient Instructions     Started on wellbutrin 150 mg.    Weane off sertraline - 1 tab every other day for 1 week, then stop.     Encouraged good diet and exercise.   Encouraged to see a counselor.   Return in 4-6 weeks for recheck.   Return to clinic with change/worsening of symptoms.       Suicide Emergency First call for help:  [phone]    Depression: Tips to Help Yourself  As your healthcare providers help treat your depression, you can also help yourself. Keep in mind that your illness affects you emotionally, physically, mentally, and socially. So full recovery will take time. Take care of your body and your soul, and be patient with yourself as you get better.    Self-care    Educate yourself. Read about treatment and medicine options. If you have the energy, attend local conferences or support groups. Keep a list of useful websites and helpful books and use them as needed. This illness is not your fault. Don t blame yourself for your depression.    Manage early symptoms. If you notice symptoms returning, experience triggers, or identify other factors that may lead to a depressive episode, get help as soon as possible. Ask trusted friends and  family to monitor your behavior and let you know if they see anything of concern.    Work with your provider. Find a provider you can trust. Communicate honestly with that person and share information on your treatment for depression and your reaction to medicines.    Be prepared for a crisis. Know what to do if you experience a crisis. Keep the phone number of a crisis hotline and know the location of your community's urgent care centers and the closest emergency department.    Hold off on big decisions. Depression can cloud your judgment. So wait until you feel better before making major life decisions, such as changing jobs, moving, or getting  or .    Be patient. Recovering from depression is a process. Don t be discouraged if it takes some time to feel better.    Keep it simple. Depression saps your energy and concentration. So you won t be able to do all the things you used to do. Set small goals and do what you can.    Be with others. Don t isolate yourself--you ll only feel worse. Try to be with other people. And take part in fun activities when you can. Go to a movie, digedugame, Jain service, or social event. Talk openly with people you can trust. And accept help when it s offered.  Take care of your body  People with depression often lose the desire to take care of themselves. That only makes their problems worse. During treatment and afterward, make a point to:    Exercise. It s a great way to take care of your body. And studies have shown that exercise helps fight depression.    Avoid drugs and alcohol. These may ease the pain in the short term. But they ll only make your problems worse in the long run.    Get relief from stress. Ask your healthcare provider for relaxation exercises and techniques to help relieve stress.    Eat right. A balanced and healthy diet helps keep your body healthy.  Date Last Reviewed: 1/1/2017 2000-2017 [employer]. [address] [facility],  [place] [address]. All rights reserved. This information is not intended as a substitute for professional medical care. Always follow your healthcare professional's instructions.         Treating Anxiety Disorders with Therapy    If you have an anxiety disorder, you don t have to suffer anymore. Treatment is available. Therapy (also called counseling) is often a helpful treatment for anxiety disorders. With therapy, a specially trained professional (therapist) helps you face and learn to manage your anxiety. Therapy can be short-term or long-term depending on your needs. In some cases, medicine may also be prescribed with therapy. It may take time before you notice how much therapy is helping, but stick with it. With therapy, you can feel better.  Cognitive behavioral therapy (CBT)  Cognitive behavioral therapy (CBT) teaches you to manage anxiety. It does this by helping you understand how you think and act when you re anxious. Research has shown CBT to be a very effective treatment for anxiety disorders. How CBT is run is almost like a class. It involves homework and activities to build skills that teach you to cope with anxiety step by step. It can be done in a group or one-on-one, and often takes place for a set number of sessions. CBT has two main parts:    Cognitive therapy helps you identify the negative, irrational thoughts that occur with your anxiety. You ll learn to replace these with more positive, realistic thoughts.    Behavioral therapy helps you change how you react to anxiety. You ll learn coping skills and methods for relaxing to help you better deal with anxiety.  Other forms of therapy  Other therapy methods may work better for you than CBT. Or, you may move from CBT to another form of therapy as your treatment needs change. This may mean meeting with a therapist by yourself or in a group. Therapy can also help you work through problems in your life, such as drug or alcohol dependence, that may be  making your anxiety worse.  Getting better takes time  Therapy will help you feel better and teach you skills to help manage anxiety long term. But change doesn t happen right away. It takes a commitment from you. And treatment only works if you learn to face the causes of your anxiety. So, you might feel worse before you feel better. This can sometimes make it hard to stick with it. But remember: Therapy is a very effective treatment. The results will be well worth it.  Helping yourself  If anxiety is wearing you down, here are some things you can do to cope:    Check with your doctor and rule out any physical problems that may be causing the anxiety symptoms.    If an anxiety disorder is diagnosed, seek mental healthcare. This is an illness and it can respond to treatment. Most types of anxiety disorders will respond to talk therapy and medicine.    Educate yourself about anxiety disorders. Keep track of helpful online resources and books you can use during stressful periods.    Try stress management techniques such as meditation.    Consider online or in-person support groups.    Don t fight your feelings. Anxiety feeds itself. The more you worry about it, the worse it gets. Instead, try to identify what might have triggered your anxiety. Then try to put this threat in perspective.    Keep in mind that you can t control everything about a situation. Change what you can and let the rest take its course.    Exercise -- it s a great way to relieve tension and help your body feel relaxed.    Examine your life for stress, and try to find ways to reduce it.    Avoid caffeine and nicotine, which can make anxiety symptoms worse.    Fight the temptation to turn to alcohol or unprescribed drugs for relief. They only make things worse in the long run.   Date Last Reviewed: 1/1/2017 2000-2017 [employer]. [address] [facility], [place] [address]. All rights reserved. This information is not intended as a  substitute for professional medical care. Always follow your healthcare professional's instructions.          Counselors:     [facility]: [phone]    Divya Azar: [phone]    Reshma Boydnicole: [phone]    Meliton Azar CNP, [facility]: [phone]    Maliha Lott: [phone]    [facility]: [phone]    Chris Ramsay: [phone]    [phone]    [facility]: [phone]    Carolnie Omalley: [phone]    [facility]: [phone]    Adolescent Counseling:   [facility]: [phone]    [facility]: [phone]    [facility]: [phone]           Mariaelena Gutierres PA-C PA-C..................2021 9:39 AM

## 2021-06-24 NOTE — PATIENT INSTRUCTIONS
Started on wellbutrin 150 mg.    Weane off sertraline - 1 tab every other day for 1 week, then stop.     Encouraged good diet and exercise.   Encouraged to see a counselor.   Return in 4-6 weeks for recheck.   Return to clinic with change/worsening of symptoms.       Suicide Emergency First call for help:  [phone]    Depression: Tips to Help Yourself  As your healthcare providers help treat your depression, you can also help yourself. Keep in mind that your illness affects you emotionally, physically, mentally, and socially. So full recovery will take time. Take care of your body and your soul, and be patient with yourself as you get better.    Self-care    Educate yourself. Read about treatment and medicine options. If you have the energy, attend local conferences or support groups. Keep a list of useful websites and helpful books and use them as needed. This illness is not your fault. Don t blame yourself for your depression.    Manage early symptoms. If you notice symptoms returning, experience triggers, or identify other factors that may lead to a depressive episode, get help as soon as possible. Ask trusted friends and family to monitor your behavior and let you know if they see anything of concern.    Work with your provider. Find a provider you can trust. Communicate honestly with that person and share information on your treatment for depression and your reaction to medicines.    Be prepared for a crisis. Know what to do if you experience a crisis. Keep the phone number of a crisis hotline and know the location of your community's urgent care centers and the closest emergency department.    Hold off on big decisions. Depression can cloud your judgment. So wait until you feel better before making major life decisions, such as changing jobs, moving, or getting  or .    Be patient. Recovering from depression is a process. Don t be discouraged if it takes some time to feel  better.    Keep it simple. Depression saps your energy and concentration. So you won t be able to do all the things you used to do. Set small goals and do what you can.    Be with others. Don t isolate yourself--you ll only feel worse. Try to be with other people. And take part in fun activities when you can. Go to a movie, ballgame, [employer] service, or social event. Talk openly with people you can trust. And accept help when it s offered.  Take care of your body  People with depression often lose the desire to take care of themselves. That only makes their problems worse. During treatment and afterward, make a point to:    Exercise. It s a great way to take care of your body. And studies have shown that exercise helps fight depression.    Avoid drugs and alcohol. These may ease the pain in the short term. But they ll only make your problems worse in the long run.    Get relief from stress. Ask your healthcare provider for relaxation exercises and techniques to help relieve stress.    Eat right. A balanced and healthy diet helps keep your body healthy.  Date Last Reviewed: 1/1/2017 2000-2017 [employer]. [address] [place] [address]. All rights reserved. This information is not intended as a substitute for professional medical care. Always follow your healthcare professional's instructions.         Treating Anxiety Disorders with Therapy    If you have an anxiety disorder, you don t have to suffer anymore. Treatment is available. Therapy (also called counseling) is often a helpful treatment for anxiety disorders. With therapy, a specially trained professional (therapist) helps you face and learn to manage your anxiety. Therapy can be short-term or long-term depending on your needs. In some cases, medicine may also be prescribed with therapy. It may take time before you notice how much therapy is helping, but stick with it. With therapy, you can feel better.  Cognitive behavioral  therapy (CBT)  Cognitive behavioral therapy (CBT) teaches you to manage anxiety. It does this by helping you understand how you think and act when you re anxious. Research has shown CBT to be a very effective treatment for anxiety disorders. How CBT is run is almost like a class. It involves homework and activities to build skills that teach you to cope with anxiety step by step. It can be done in a group or one-on-one, and often takes place for a set number of sessions. CBT has two main parts:    Cognitive therapy helps you identify the negative, irrational thoughts that occur with your anxiety. You ll learn to replace these with more positive, realistic thoughts.    Behavioral therapy helps you change how you react to anxiety. You ll learn coping skills and methods for relaxing to help you better deal with anxiety.  Other forms of therapy  Other therapy methods may work better for you than CBT. Or, you may move from CBT to another form of therapy as your treatment needs change. This may mean meeting with a therapist by yourself or in a group. Therapy can also help you work through problems in your life, such as drug or alcohol dependence, that may be making your anxiety worse.  Getting better takes time  Therapy will help you feel better and teach you skills to help manage anxiety long term. But change doesn t happen right away. It takes a commitment from you. And treatment only works if you learn to face the causes of your anxiety. So, you might feel worse before you feel better. This can sometimes make it hard to stick with it. But remember: Therapy is a very effective treatment. The results will be well worth it.  Helping yourself  If anxiety is wearing you down, here are some things you can do to cope:    Check with your doctor and rule out any physical problems that may be causing the anxiety symptoms.    If an anxiety disorder is diagnosed, seek mental healthcare. This is an illness and it can respond to  treatment. Most types of anxiety disorders will respond to talk therapy and medicine.    Educate yourself about anxiety disorders. Keep track of helpful online resources and books you can use during stressful periods.    Try stress management techniques such as meditation.    Consider online or in-person support groups.    Don t fight your feelings. Anxiety feeds itself. The more you worry about it, the worse it gets. Instead, try to identify what might have triggered your anxiety. Then try to put this threat in perspective.    Keep in mind that you can t control everything about a situation. Change what you can and let the rest take its course.    Exercise -- it s a great way to relieve tension and help your body feel relaxed.    Examine your life for stress, and try to find ways to reduce it.    Avoid caffeine and nicotine, which can make anxiety symptoms worse.    Fight the temptation to turn to alcohol or unprescribed drugs for relief. They only make things worse in the long run.   Date Last Reviewed: 2017-2017 [employer]. [address] [place] [address]. All rights reserved. This information is not intended as a substitute for professional medical care. Always follow your healthcare professional's instructions.          Counselors:     [facility]: [phone]    Divya Azar: [phone]    Reshma Griffith: [phone]    Meliton [facility]ar [facility]NP[facility]: [phone]    Maliha Lott: [phone]    [facility]: [phone]    Chris Ramsay: [phone]    [phone]    [facility]: [phone]    Caroline Omalley: [phone]    [facility]: [phone]    Adolescent Counseling:   [facility]: [phone]    [facility]: [phone]    [facility]: [phone]

## 2021-06-24 NOTE — NURSING NOTE
Chief Complaint   Patient presents with     Medication Therapy Management         Medication Reconciliation: complete    Tiffanie Iqbal, LPN

## 2021-06-25 ASSESSMENT — ANXIETY QUESTIONNAIRES: GAD7 TOTAL SCORE: 0

## 2021-07-01 ENCOUNTER — MYC MEDICAL ADVICE (OUTPATIENT)
Dept: OBGYN | Facility: OTHER | Age: 41
End: 2021-07-01

## 2021-07-01 DIAGNOSIS — R19.7 DIARRHEA, UNSPECIFIED TYPE: Primary | ICD-10-CM

## 2021-07-01 NOTE — TELEPHONE ENCOUNTER
-Received signed letters from provider.   -Called Joy, pt's mother and informed her that the letters are ready. Placed at the . Joy says the pt's father will  the letters.    [facility] visit for sinus problem on 6/10/21.     Prescribed the following:  amoxicillin-clavulanate (AUGMENTIN) 875-125 MG tablet 20 tablet 0 6/10/2021 6/20/2021 --   Sig - Route: Take 1 tablet by mouth 2 times daily for 10 days     Mariaelena MEADOWSclyde out of the clinic today and tomorrow.    Routing to covering provider to review and advise. Lindsey Fernando RN .............. 7/1/2021  3:32 PM

## 2021-07-01 NOTE — TELEPHONE ENCOUNTER
Per patient's taggat message,     I came in for a sinus infection and was put on antibiotics.  Since the last day of taking the medication I have had loose stool that is full of mucus. Do you think I got c-diff from the medication? Should I give it more time to clear or should I come in? I'm going to the bathroom 6-7 times every morning.

## 2021-07-01 NOTE — TELEPHONE ENCOUNTER
Sorry didn't see the [employer] message.  I'll put in an order for a C. Diff stool test.  Fareed Castellanos MD on 7/1/2021 at 4:06 PM

## 2021-07-01 NOTE — TELEPHONE ENCOUNTER
Leela Azar, ROD Gutierres, Mariaelena Arias PA-C;  Erx 46 minutes ago (2:38 PM)     This was sent to [facility] by mistake.   Thanks, Leela    Routing comment

## 2021-08-13 ENCOUNTER — MYC MEDICAL ADVICE (OUTPATIENT)
Dept: FAMILY MEDICINE | Facility: OTHER | Age: 41
End: 2021-08-13

## 2021-08-15 ENCOUNTER — MYC MEDICAL ADVICE (OUTPATIENT)
Dept: FAMILY MEDICINE | Facility: OTHER | Age: 41
End: 2021-08-15

## 2021-08-30 DIAGNOSIS — F32.2 SEVERE DEPRESSION (H): ICD-10-CM

## 2021-08-30 DIAGNOSIS — F41.9 SEVERE ANXIETY: ICD-10-CM

## 2021-09-01 RX ORDER — BUPROPION HYDROCHLORIDE 150 MG/1
TABLET ORAL
Qty: 90 TABLET | Refills: 3 | OUTPATIENT
Start: 2021-09-01

## 2021-09-01 NOTE — TELEPHONE ENCOUNTER
Wellbutrin has been discontinued.  Refilled sertraline.  Mariaelena Gutierres PA-C.......... 9/1/2021 12:36 PM

## 2021-09-01 NOTE — TELEPHONE ENCOUNTER
[facility]  sent Rx request for the following:     Requested Prescriptions   Pending Prescriptions Disp Refills     buPROPion (WELLBUTRIN XL) 150 MG 24 hr tablet [Pharmacy Med Name: buPROPion HCl ER (XL) 150 MG Oral Tablet Extended Release 24 Hour] 90 tablet 3     Sig: TAKE 1 TABLET BY MOUTH IN  THE MORNING        Last Prescription Date:   6/24/201  Last Fill Qty/Refills:         90, R-0    Last Office Visit:              6/24/2021  Future Office visit:           none    Routing refill request to provider for review/approval because:    PHQ-9 and GAD7 Scores 6/24/2021   PHQ-9 Total Score 5   PEDRO-7 Total Score 0     Associated Diagnoses    Severe anxiety [F41.9]  - Primary       Severe depression (H) [F32.2]            SSRIs Protocol Failed - 8/30/2021  4:40 AM        Failed - PHQ-9 score less than 5 in past 6 months     Please review last PHQ-9 score.           Passed - Medication is Bupropion     If the medication is Bupropion (Wellbutrin), and the patient is taking for smoking cessation; OK to refill.          Unable to complete prescription refill per RN Medication Refill Policy.................... Hilda Salas RN ....................  9/1/2021   11:37 AM

## 2021-10-09 ENCOUNTER — HEALTH MAINTENANCE LETTER (OUTPATIENT)
Age: 41
End: 2021-10-09

## 2022-05-21 ENCOUNTER — HEALTH MAINTENANCE LETTER (OUTPATIENT)
Age: 42
End: 2022-05-21

## 2022-09-17 ENCOUNTER — HEALTH MAINTENANCE LETTER (OUTPATIENT)
Age: 42
End: 2022-09-17

## 2022-12-04 ASSESSMENT — PATIENT HEALTH QUESTIONNAIRE - PHQ9
SUM OF ALL RESPONSES TO PHQ QUESTIONS 1-9: 7
10. IF YOU CHECKED OFF ANY PROBLEMS, HOW DIFFICULT HAVE THESE PROBLEMS MADE IT FOR YOU TO DO YOUR WORK, TAKE CARE OF THINGS AT HOME, OR GET ALONG WITH OTHER PEOPLE: SOMEWHAT DIFFICULT
SUM OF ALL RESPONSES TO PHQ QUESTIONS 1-9: 7

## 2022-12-04 ASSESSMENT — ENCOUNTER SYMPTOMS
DIARRHEA: 0
CHILLS: 0
FREQUENCY: 0
ABDOMINAL PAIN: 0
CONSTIPATION: 1
HEMATOCHEZIA: 0
HEMATURIA: 0
HEARTBURN: 0
JOINT SWELLING: 0
NAUSEA: 0
DYSURIA: 0
MYALGIAS: 0
EYE PAIN: 0
NERVOUS/ANXIOUS: 1
FEVER: 0
HEADACHES: 0
DIZZINESS: 0
COUGH: 0
PARESTHESIAS: 0
ARTHRALGIAS: 0
WEAKNESS: 0
SHORTNESS OF BREATH: 0
SORE THROAT: 0
BREAST MASS: 0
PALPITATIONS: 0

## 2022-12-05 ENCOUNTER — OFFICE VISIT (OUTPATIENT)
Dept: FAMILY MEDICINE | Facility: OTHER | Age: 42
End: 2022-12-05
Attending: PHYSICIAN ASSISTANT
Payer: COMMERCIAL

## 2022-12-05 VITALS
BODY MASS INDEX: 38.22 KG/M2 | HEIGHT: 63 IN | RESPIRATION RATE: 18 BRPM | WEIGHT: 215.7 LBS | TEMPERATURE: 98.1 F | SYSTOLIC BLOOD PRESSURE: 110 MMHG | DIASTOLIC BLOOD PRESSURE: 80 MMHG | OXYGEN SATURATION: 98 % | HEART RATE: 86 BPM

## 2022-12-05 DIAGNOSIS — Z12.31 ENCOUNTER FOR SCREENING MAMMOGRAM FOR MALIGNANT NEOPLASM OF BREAST: ICD-10-CM

## 2022-12-05 DIAGNOSIS — Z13.220 SCREENING CHOLESTEROL LEVEL: ICD-10-CM

## 2022-12-05 DIAGNOSIS — Z13.1 SCREENING FOR DIABETES MELLITUS: ICD-10-CM

## 2022-12-05 DIAGNOSIS — Z01.419 PAP TEST, AS PART OF ROUTINE GYNECOLOGICAL EXAMINATION: ICD-10-CM

## 2022-12-05 DIAGNOSIS — Z00.00 ROUTINE GENERAL MEDICAL EXAMINATION AT A HEALTH CARE FACILITY: Primary | ICD-10-CM

## 2022-12-05 DIAGNOSIS — Z23 NEED FOR DIPHTHERIA-TETANUS-PERTUSSIS (TDAP) VACCINE: ICD-10-CM

## 2022-12-05 LAB
ANION GAP SERPL CALCULATED.3IONS-SCNC: 9 MMOL/L (ref 7–15)
BUN SERPL-MCNC: 12.7 MG/DL (ref 6–20)
CALCIUM SERPL-MCNC: 9 MG/DL (ref 8.6–10)
CHLORIDE SERPL-SCNC: 106 MMOL/L (ref 98–107)
CHOLEST SERPL-MCNC: 174 MG/DL
CREAT SERPL-MCNC: 0.8 MG/DL (ref 0.51–0.95)
DEPRECATED HCO3 PLAS-SCNC: 26 MMOL/L (ref 22–29)
GFR SERPL CREATININE-BSD FRML MDRD: >90 ML/MIN/1.73M2
GLUCOSE SERPL-MCNC: 94 MG/DL (ref 70–99)
HDLC SERPL-MCNC: 52 MG/DL
LDLC SERPL CALC-MCNC: 104 MG/DL
NONHDLC SERPL-MCNC: 122 MG/DL
POTASSIUM SERPL-SCNC: 4 MMOL/L (ref 3.4–5.3)
SODIUM SERPL-SCNC: 141 MMOL/L (ref 136–145)
TRIGL SERPL-MCNC: 88 MG/DL

## 2022-12-05 PROCEDURE — 80061 LIPID PANEL: CPT | Mod: ZL | Performed by: PHYSICIAN ASSISTANT

## 2022-12-05 PROCEDURE — 87624 HPV HI-RISK TYP POOLED RSLT: CPT | Mod: ZL | Performed by: PHYSICIAN ASSISTANT

## 2022-12-05 PROCEDURE — G0123 SCREEN CERV/VAG THIN LAYER: HCPCS | Performed by: PHYSICIAN ASSISTANT

## 2022-12-05 PROCEDURE — 99396 PREV VISIT EST AGE 40-64: CPT | Mod: 25 | Performed by: PHYSICIAN ASSISTANT

## 2022-12-05 PROCEDURE — 90471 IMMUNIZATION ADMIN: CPT | Performed by: PHYSICIAN ASSISTANT

## 2022-12-05 PROCEDURE — 80048 BASIC METABOLIC PNL TOTAL CA: CPT | Mod: ZL | Performed by: PHYSICIAN ASSISTANT

## 2022-12-05 PROCEDURE — 90715 TDAP VACCINE 7 YRS/> IM: CPT | Performed by: PHYSICIAN ASSISTANT

## 2022-12-05 PROCEDURE — 36415 COLL VENOUS BLD VENIPUNCTURE: CPT | Mod: ZL | Performed by: PHYSICIAN ASSISTANT

## 2022-12-05 RX ORDER — VILAZODONE HYDROCHLORIDE 10 MG/1
10 TABLET ORAL DAILY
COMMUNITY

## 2022-12-05 ASSESSMENT — ENCOUNTER SYMPTOMS
ARTHRALGIAS: 0
EYE PAIN: 0
HEARTBURN: 0
MYALGIAS: 0
HEMATURIA: 0
NERVOUS/ANXIOUS: 1
SHORTNESS OF BREATH: 0
DIARRHEA: 0
BREAST MASS: 0
JOINT SWELLING: 0
COUGH: 0
NAUSEA: 0
HEMATOCHEZIA: 0
CHILLS: 0
SORE THROAT: 0
FREQUENCY: 0
PALPITATIONS: 0
HEADACHES: 0
DIZZINESS: 0
ABDOMINAL PAIN: 0
DYSURIA: 0
PARESTHESIAS: 0
FEVER: 0
WEAKNESS: 0
CONSTIPATION: 1

## 2022-12-05 ASSESSMENT — PAIN SCALES - GENERAL: PAINLEVEL: NO PAIN (0)

## 2022-12-05 NOTE — PROGRESS NOTES
SUBJECTIVE:   CC: Stephanie is an 42 year old who presents for preventive health visit.   Patient has been advised of split billing requirements and indicates understanding: Yes  Healthy Habits:     Getting at least 3 servings of Calcium per day:  NO    Bi-annual eye exam:  NO    Dental care twice a year:  NO    Sleep apnea or symptoms of sleep apnea:  None    Diet:  Regular (no restrictions)    Frequency of exercise:  4-5 days/week    Duration of exercise:  30-45 minutes    Taking medications regularly:  Yes    Medication side effects:  None    PHQ-2 Total Score: 2    Additional concerns today:  No    No LMP recorded (lmp unknown). (Menstrual status: IUD).   Contraception: IUD, 1/29/2019, mirena  Risk for STI?: none  Last pap: 1/29/2019 - normal, repeated  Any hx of abnormal paps:  In 20s  FH of early CA?: none  Cholesterol/DM concerns/screening: none, completed  Tobacco?: no  Lung cancer screening: na  Calcium intake: yes with Tums  DEXA: na  Last mammo: none, ordered  Colonoscopy: na  Hepatitis C screen: history of donating blood   HIV screen: history of donating blood    Immunizations: needs tdap     Patient has history of seeing OB/GYN for pelvic pain and pain with intercourse.  Has recently seen OB/GYN.  Pelvic ultrasound was unremarkable.  Has a Mirena currently.  No pregnancy or STD concerns.    Today's PHQ-2 Score:   PHQ-2 ( 1999 [employer]) 12/4/2022   Q1: Little interest or pleasure in doing things 1   Q2: Feeling down, depressed or hopeless 1   PHQ-2 Score 2   PHQ-2 Total Score (12-17 Years)- Positive if 3 or more points; Administer PHQ-A if positive -   Q1: Little interest or pleasure in doing things Several days   Q2: Feeling down, depressed or hopeless Several days   PHQ-2 Score 2       Have you ever done Advance Care Planning? (For example, a Health Directive, POLST, or a discussion with a medical provider or your loved ones about your wishes): No, advance care planning information given to patient to  review.  Patient declined advance care planning discussion at this time.    Social History     Tobacco Use     Smoking status: Never     Smokeless tobacco: Never   Substance Use Topics     Alcohol use: Yes     Comment: Alcoholic Drinks/day: 1 drink/week         Alcohol Use 12/4/2022   Prescreen: >3 drinks/day or >7 drinks/week? No       Reviewed orders with patient.  Reviewed health maintenance and updated orders accordingly - Yes  Labs reviewed in EPIC  BP Readings from Last 3 Encounters:   12/05/22 110/80   06/24/21 106/70   06/10/21 124/70    Wt Readings from Last 3 Encounters:   12/05/22 97.8 kg (215 lb 11.2 oz)   06/24/21 90.7 kg (200 lb)   06/10/21 90.3 kg (199 lb)                  Patient Active Problem List   Diagnosis     Allergic rhinitis     Breast hypertrophy     Encounter for other general counseling or advice on contraception     Family history of psychiatric condition     Carbuncle and furuncle     Cervicitis and endocervicitis     Obesity     Past Surgical History:   Procedure Laterality Date     ADENOIDECTOMY      child     MAMMOPLASTY AUGMENTATION      07/2012, Bilateral reduction mammoplasty and suction assisted lipectomy       Social History     Tobacco Use     Smoking status: Never     Smokeless tobacco: Never   Substance Use Topics     Alcohol use: Yes     Comment: Alcoholic Drinks/day: 1 drink/week     Family History   Problem Relation Age of Onset     Other - See Comments Mother         Psychiatric illness,Bipolar     Cancer Father         Cancer,brain tumor     Family History Negative Sister         Good Health     Family History Negative Sister         Good Health,unsure of where about due to mother lost children     Family History Negative Brother         Good Health,unsure of where abouts due to mother lost children     Other - See Comments Maternal Grandmother         Stroke,smoker     Heart Disease Maternal Grandmother         Heart Disease     Unknown/Adopted Maternal Grandfather          Unknown     Arthritis Paternal Grandmother         Arthritis     Heart Disease Paternal Grandfather         Heart Disease     Diabetes Paternal Aunt         Diabetes     Genetic Disorder Other         Genetic,FH Heart Disease-MGM,  age 57-FH Hypertension-mother-FH Thyroid Problems-mother-FH Depression-mother (bipolar), MGM-FH Demqzk-NXE-UT Znvfafkyzatv-VAB-QO Skin cancer-PGM     Genetic Disorder Other         Genetic,FH Heart Disease-MGM,  age 57-FH Hypertension-mother-FH Thyroid Problems-mother-FH Depression-mother (bipolar), MGM-FH Qgzfuo-MFK-VR Iamleipowdbv-FKM-QR Skin cancer-PGM-Father - healthy-Siblings - healthy         Current Outpatient Medications   Medication Sig Dispense Refill     calcium carbonate (TUMS) 500 MG chewable tablet        cetirizine (ZYRTEC) 10 MG tablet Take 10 mg by mouth daily       cholecalciferol (VITAMIN D3) 5000 units (125 mcg) capsule Take by mouth daily       diphenhydrAMINE-acetaminophen (TYLENOL PM)  MG tablet        levonorgestrel (MIRENA) 20 MCG/24HR IUD        Magnesium Oxide 500 MG TABS        Melatonin 3 MG TBDP        vilazodone (VIIBRYD) 10 MG TABS tablet Take 10 mg by mouth daily       Allergies   Allergen Reactions     Sulfa Drugs Hives and Nausea and Vomiting     Recent Labs   Lab Test 16  1032   CR 0.72   GFRESTBLACK >60   POTASSIUM 3.6            FHS-7: No flowsheet data found.      Pertinent mammograms are reviewed under the imaging tab.    History of abnormal Pap smear:   PAP / HPV 2019   PAP (Historical) NIL     Reviewed and updated as needed this visit by clinical staff   Tobacco  Allergies  Meds              Reviewed and updated as needed this visit by Provider                 Past Medical History:   Diagnosis Date     Allergy status to unspecified drugs, medicaments and biological substances status     nfections- allergies     Anxiety disorder     Anxiety Disorder     Other specified postprocedural states     2005,Elective      "Personal history of other medical treatment (CODE)     2005,Abn. pap 2005      Past Surgical History:   Procedure Laterality Date     ADENOIDECTOMY      child     MAMMOPLASTY AUGMENTATION      2012, Bilateral reduction mammoplasty and suction assisted lipectomy     OB History    Para Term  AB Living   1 0 0 0 1 0   SAB IAB Ectopic Multiple Live Births   1 0 0 0 0      # Outcome Date GA Lbr Mikel/2nd Weight Sex Delivery Anes PTL Lv   1 SAB                Review of Systems   Constitutional: Negative for chills and fever.   HENT: Negative for congestion, ear pain, hearing loss and sore throat.    Eyes: Negative for pain and visual disturbance.   Respiratory: Negative for cough and shortness of breath.    Cardiovascular: Negative for chest pain, palpitations and peripheral edema.   Gastrointestinal: Positive for constipation. Negative for abdominal pain, diarrhea, heartburn, hematochezia and nausea.   Breasts:  Negative for tenderness, breast mass and discharge.   Genitourinary: Positive for pelvic pain. Negative for dysuria, frequency, genital sores, hematuria, urgency, vaginal bleeding and vaginal discharge.   Musculoskeletal: Negative for arthralgias, joint swelling and myalgias.   Skin: Negative for rash.   Neurological: Negative for dizziness, weakness, headaches and paresthesias.   Psychiatric/Behavioral: Negative for mood changes. The patient is nervous/anxious.           OBJECTIVE:   /80 (BP Location: Right arm, Patient Position: Sitting, Cuff Size: Adult Large)   Pulse 86   Temp 98.1  F (36.7  C) (Tympanic)   Resp 18   Ht 1.594 m (5' 2.75\")   Wt 97.8 kg (215 lb 11.2 oz)   LMP  (LMP Unknown)   SpO2 98%   BMI 38.51 kg/m    Physical Exam  Vitals reviewed.   Constitutional:       General: She is not in acute distress.     Appearance: Normal appearance. She is well-developed. She is not ill-appearing.   HENT:      Head: Normocephalic and atraumatic.      Right Ear: Tympanic membrane, ear " canal and external ear normal.      Left Ear: Tympanic membrane, ear canal and external ear normal.      Nose: Nose normal. No rhinorrhea.      Mouth/Throat:      Mouth: Mucous membranes are moist.      Pharynx: No oropharyngeal exudate.   Eyes:      General:         Right eye: No discharge.         Left eye: No discharge.      Extraocular Movements: Extraocular movements intact.      Conjunctiva/sclera: Conjunctivae normal.      Pupils: Pupils are equal, round, and reactive to light.   Neck:      Thyroid: No thyromegaly.      Trachea: No tracheal deviation.   Cardiovascular:      Rate and Rhythm: Normal rate and regular rhythm.      Heart sounds: Normal heart sounds, S1 normal and S2 normal. No murmur heard.    No friction rub. No S3 or S4 sounds.   Pulmonary:      Effort: Pulmonary effort is normal. No respiratory distress.      Breath sounds: Normal breath sounds. No wheezing or rales.   Chest:   Breasts:     Right: No inverted nipple, mass, nipple discharge or skin change.      Left: No inverted nipple, mass, nipple discharge or skin change.   Abdominal:      General: Abdomen is flat. Bowel sounds are normal.      Palpations: Abdomen is soft. There is no mass.      Tenderness: There is no abdominal tenderness. There is no right CVA tenderness, left CVA tenderness, guarding or rebound.      Hernia: No hernia is present.   Genitourinary:     General: Normal vulva.      Labia:         Right: No rash or tenderness.         Left: No rash or tenderness.       Vagina: Normal. No vaginal discharge or erythema.      Cervix: No lesion.      Uterus: Normal.       Adnexa: Right adnexa normal and left adnexa normal.        Right: No mass or tenderness.          Left: No mass or tenderness.        Comments: IUD strings appreciated.   Pap completed: yes  Musculoskeletal:         General: Normal range of motion.      Cervical back: Normal range of motion and neck supple.      Right lower leg: No edema.      Left lower leg: No  edema.   Lymphadenopathy:      Cervical: No cervical adenopathy.      Upper Body:      Right upper body: No supraclavicular or axillary adenopathy.      Left upper body: No supraclavicular or axillary adenopathy.   Skin:     General: Skin is warm and dry.      Capillary Refill: Capillary refill takes less than 2 seconds.      Findings: No rash.   Neurological:      General: No focal deficit present.      Mental Status: She is alert and oriented to person, place, and time.      Motor: No abnormal muscle tone.      Deep Tendon Reflexes: Reflexes are normal and symmetric.   Psychiatric:         Mood and Affect: Mood normal.         Behavior: Behavior normal.         Thought Content: Thought content normal.         Judgment: Judgment normal.           Diagnostic Test Results:  Labs reviewed in Epic    ASSESSMENT/PLAN:       ICD-10-CM    1. Routine general medical examination at a health care facility  Z00.00       2. Pap test, as part of routine gynecological examination  Z01.419 Pap Screen with HPV - recommended age 30 - 65 years      3. Screening cholesterol level  Z13.220 Lipid Panel      4. Screening for diabetes mellitus  Z13.1 Basic Metabolic Panel      5. Encounter for screening mammogram for malignant neoplasm of breast  Z12.31 MA Screen Bilateral w/Keanu      6. Need for diphtheria-tetanus-pertussis (Tdap) vaccine  Z23 GH IMM - TDAP (ADACEL, BOOSTRIX)        Patient was given a Tdap.  Ordered mammogram for breast cancer screening.  Complete lipid panel and BMP for cholesterol and diabetes mellitus screening.  Completed Pap and HPV for cervical cancer screening.  Encourage good diet and exercise.    Repeat physical in 1 year.    Patient has been advised of split billing requirements and indicates understanding: Yes      COUNSELING:  Reviewed preventive health counseling, as reflected in patient instructions       Regular exercise       Healthy diet/nutrition       Vision screening       Hearing screening        "Contraception       Osteoporosis prevention/bone health       Safe sex practices/STD prevention       Colorectal Cancer Screening       Consider Hep C screening for all patients one time for ages 18-79 years       HIV screeninx in teen years, 1x in adult years, and at intervals if high risk      BMI:   Estimated body mass index is 38.51 kg/m  as calculated from the following:    Height as of this encounter: 1.594 m (5' 2.75\").    Weight as of this encounter: 97.8 kg (215 lb 11.2 oz).   Weight management plan: Discussed healthy diet and exercise guidelines      She reports that she has never smoked. She has never used smokeless tobacco.      Mariaelena Gutierres PA-C  [facility]  Answers for HPI/ROS submitted by the patient on 2022  If you checked off any problems, how difficult have these problems made it for you to do your work, take care of things at home, or get along with other people?: Somewhat difficult  PHQ9 TOTAL SCORE: 7      "

## 2022-12-05 NOTE — PATIENT INSTRUCTIONS
"Healthy Strategies  Eat at least 3 meals a day and never skip breakfast.  Eat more slowly.  Decrease portion size.  Provide structure by using meal replacement bars or shakes, and/or low calorie frozen meals.  For good nutrition incorporate fruit, vegetables, whole grains, lean protein, and low-fat dairy.  Remove trigger foods from yourenvironment to avoid impulse eating.  Increase physical activity: get a pedometer and aim for 10,000 steps a day or 30-35 minutes of activity 5 days per week.  Weigh yourself daily or at least weekly.  Keep a record of what you eat and your activity.  Establish a support system such as afriend, group or program.    11. Read Hammad Smith's \"Eat to Live\". Remember it is important to have a minimum of 1200 calories a day, okay to use olive oil, 40 grams of fiber daily. No more than two servings (the size of your palm) of red meat a week.     Please consider the following general health recommendations:    Eat a quality diet (generally, low in simple sugars, starches, cholesterol and saturated fat.)    Please get 1200 mg of calcium in divided doses with 800 units vitamin D in your diet daily. Take supplements as needed to obtain full recommended amounts.     Stay physically active. Regular walking or other exercise is one of the best ways to minimize pain of arthritis; maintain independence and mobility; maintain bone strength; maintain conditioning of your heart. Find something you enjoy and a friend to do it with you.    Maintain ideal weight. Your Body mass index is There is no height or weight on file to calculate BMI.. Generally a BMI of 20-25 is considered ideal. Overweight is defined as 25-30, obese is 30-35 and markedly obese is greater than 35.    Apply sun block (SPF 25 or greater) on exposed skin anytime you are out in the sun to prevent skin cancer.     Wear a seatbelt whenever you are in a car.    Obtain a flu shot every fall.    You should have a tetanus booster at least " once every 10 years.    Schedule a mammogram annually starting at the age of 40 years old unless recommended earlier by your primary care provider. Come for a general exam once yearly.    Check blood sugar annually. Cholesterol annually unless you have had a normal level when last checked within 5 years.     I recommend that you have a general physical exam every year. You should have a pap test every 3 years between the ages of 21 and 30 and every 3-5 years between the ages of 30 and 65 depending on your test unless you have had previous abnormal pap smears, (in these cases the exams and PAP's should be done on a schedule as recommended by your primary care provider). If you have had hysterectomy in the past, your future Pap plan may be different.        Preventive Health Recommendations  Female Ages 40 to 49    Yearly exam:   See your health care provider every year in order to  Review health changes.   Discuss preventive care.    Review your medicines if your doctor prescribed any.    Get a Pap test every three years (unless you have an abnormal result and your provider advises testing more often).    If you get Pap tests with HPV test, you only need to test every 5 years, unless you have an abnormal result. You do not need a Pap test if your uterus was removed (hysterectomy) and you have not had cancer.    You should be tested each year for STDs (sexually transmitted diseases), if you're at risk.   Ask your doctor if you should have a mammogram.    Have a colonoscopy (test for colon cancer) if someone in your family has had colon cancer or polyps before age 50.     Have a cholesterol test every 5 years.     Have a diabetes test (fasting glucose) after age 45. If you are at risk for diabetes, you should have this test every 3 years.    Shots: Get a flu shot each year. Get a tetanus shot every 10 years.     Nutrition:   Eat at least 5 servings of fruits and vegetables each day.  Eat whole-grain bread, whole-wheat  pasta and brown rice instead of white grains and rice.  Get adequate Calcium and Vitamin D.      Lifestyle  Exercise at least 150 minutes a week (an average of 30 minutes a day, 5 days a week). This will help you control your weight and prevent disease.  Limit alcohol to one drink per day.  No smoking.   Wear sunscreen to prevent skin cancer.  See your dentist every six months for an exam and cleaning.

## 2022-12-05 NOTE — NURSING NOTE
Pt presents to clinic today for a physical.  sensitive to smells, troubles with eating, feels she is choking or has to vomit, gets stuck in her throat, has a plantar wart on left foot.       FOOD SECURITY SCREENING QUESTIONS:    The next two questions are to help us understand your food security.  If you are feeling you need any assistance in this area, we have resources available to support you today.    Hunger Vital Signs:  Within the past 12 months we worried whether our food would run out before we got money to buy more. Never  Within the past 12 months the food we bought just didn't last and we didn't have money to get more. Never        Advance care directive on file? no  Advance care directive provided to patient? no     Medication Reconciliation: complete  Jase Winter LPN,LPN on 12/5/2022 at 3:27 PM

## 2022-12-08 LAB
BKR LAB AP GYN ADEQUACY: NORMAL
BKR LAB AP GYN INTERPRETATION: NORMAL
BKR LAB AP HPV REFLEX: NORMAL
BKR LAB AP PREVIOUS ABNORMAL: NORMAL
PATH REPORT.COMMENTS IMP SPEC: NORMAL
PATH REPORT.COMMENTS IMP SPEC: NORMAL
PATH REPORT.RELEVANT HX SPEC: NORMAL

## 2022-12-11 LAB
HUMAN PAPILLOMA VIRUS 16 DNA: NEGATIVE
HUMAN PAPILLOMA VIRUS 18 DNA: NEGATIVE
HUMAN PAPILLOMA VIRUS FINAL DIAGNOSIS: NORMAL
HUMAN PAPILLOMA VIRUS OTHER HR: NEGATIVE

## 2023-03-21 ENCOUNTER — HOSPITAL ENCOUNTER (OUTPATIENT)
Dept: MAMMOGRAPHY | Facility: OTHER | Age: 43
Discharge: HOME | End: 2023-03-21
Attending: PHYSICIAN ASSISTANT | Admitting: PHYSICIAN ASSISTANT
Payer: COMMERCIAL

## 2023-03-21 DIAGNOSIS — Z12.31 ENCOUNTER FOR SCREENING MAMMOGRAM FOR MALIGNANT NEOPLASM OF BREAST: ICD-10-CM

## 2023-03-21 PROCEDURE — 77067 SCR MAMMO BI INCL CAD: CPT

## 2024-02-25 ENCOUNTER — HEALTH MAINTENANCE LETTER (OUTPATIENT)
Age: 44
End: 2024-02-25

## 2025-03-09 ENCOUNTER — HEALTH MAINTENANCE LETTER (OUTPATIENT)
Age: 45
End: 2025-03-09

## 2025-06-05 ENCOUNTER — OFFICE VISIT (OUTPATIENT)
Dept: FAMILY MEDICINE | Facility: OTHER | Age: 45
End: 2025-06-05
Payer: COMMERCIAL

## 2025-06-05 ENCOUNTER — RESULTS FOLLOW-UP (OUTPATIENT)
Dept: FAMILY MEDICINE | Facility: OTHER | Age: 45
End: 2025-06-05

## 2025-06-05 VITALS
OXYGEN SATURATION: 96 % | WEIGHT: 204 LBS | BODY MASS INDEX: 34.83 KG/M2 | HEIGHT: 64 IN | TEMPERATURE: 98.2 F | RESPIRATION RATE: 20 BRPM | DIASTOLIC BLOOD PRESSURE: 78 MMHG | SYSTOLIC BLOOD PRESSURE: 111 MMHG | HEART RATE: 89 BPM

## 2025-06-05 DIAGNOSIS — T36.95XA ANTIBIOTIC-INDUCED YEAST INFECTION: ICD-10-CM

## 2025-06-05 DIAGNOSIS — N39.0 ACUTE UTI (URINARY TRACT INFECTION): ICD-10-CM

## 2025-06-05 DIAGNOSIS — R10.9 FLANK PAIN: ICD-10-CM

## 2025-06-05 DIAGNOSIS — R39.9 UTI SYMPTOMS: Primary | ICD-10-CM

## 2025-06-05 DIAGNOSIS — B37.9 ANTIBIOTIC-INDUCED YEAST INFECTION: ICD-10-CM

## 2025-06-05 DIAGNOSIS — R10.11 RUQ ABDOMINAL PAIN: ICD-10-CM

## 2025-06-05 DIAGNOSIS — R11.2 NAUSEA AND VOMITING, UNSPECIFIED VOMITING TYPE: ICD-10-CM

## 2025-06-05 LAB
ALBUMIN SERPL BCG-MCNC: 4.3 G/DL (ref 3.5–5.2)
ALBUMIN UR-MCNC: 30 MG/DL
ALP SERPL-CCNC: 82 U/L (ref 40–150)
ALT SERPL W P-5'-P-CCNC: 14 U/L (ref 0–50)
ANION GAP SERPL CALCULATED.3IONS-SCNC: 11 MMOL/L (ref 7–15)
APPEARANCE UR: ABNORMAL
AST SERPL W P-5'-P-CCNC: 15 U/L (ref 0–45)
BACTERIA #/AREA URNS HPF: ABNORMAL /HPF
BASOPHILS # BLD AUTO: 0 10E3/UL (ref 0–0.2)
BASOPHILS NFR BLD AUTO: 1 %
BILIRUB SERPL-MCNC: 0.5 MG/DL
BILIRUB UR QL STRIP: NEGATIVE
BUN SERPL-MCNC: 9.6 MG/DL (ref 6–20)
CALCIUM SERPL-MCNC: 9 MG/DL (ref 8.8–10.4)
CHLORIDE SERPL-SCNC: 104 MMOL/L (ref 98–107)
COLOR UR AUTO: YELLOW
CREAT SERPL-MCNC: 0.86 MG/DL (ref 0.51–0.95)
CRP SERPL-MCNC: 24.35 MG/L
EGFRCR SERPLBLD CKD-EPI 2021: 85 ML/MIN/1.73M2
EOSINOPHIL # BLD AUTO: 0.2 10E3/UL (ref 0–0.7)
EOSINOPHIL NFR BLD AUTO: 2 %
ERYTHROCYTE [DISTWIDTH] IN BLOOD BY AUTOMATED COUNT: 12.4 % (ref 10–15)
GLUCOSE SERPL-MCNC: 100 MG/DL (ref 70–99)
GLUCOSE UR STRIP-MCNC: NEGATIVE MG/DL
HCO3 SERPL-SCNC: 23 MMOL/L (ref 22–29)
HCT VFR BLD AUTO: 42.3 % (ref 35–47)
HGB BLD-MCNC: 14.4 G/DL (ref 11.7–15.7)
HGB UR QL STRIP: ABNORMAL
IMM GRANULOCYTES # BLD: 0 10E3/UL
IMM GRANULOCYTES NFR BLD: 0 %
KETONES UR STRIP-MCNC: NEGATIVE MG/DL
LEUKOCYTE ESTERASE UR QL STRIP: ABNORMAL
LYMPHOCYTES # BLD AUTO: 2.2 10E3/UL (ref 0.8–5.3)
LYMPHOCYTES NFR BLD AUTO: 26 %
MCH RBC QN AUTO: 29.6 PG (ref 26.5–33)
MCHC RBC AUTO-ENTMCNC: 34 G/DL (ref 31.5–36.5)
MCV RBC AUTO: 87 FL (ref 78–100)
MONOCYTES # BLD AUTO: 0.7 10E3/UL (ref 0–1.3)
MONOCYTES NFR BLD AUTO: 8 %
MUCOUS THREADS #/AREA URNS LPF: PRESENT /LPF
NEUTROPHILS # BLD AUTO: 5.4 10E3/UL (ref 1.6–8.3)
NEUTROPHILS NFR BLD AUTO: 63 %
NITRATE UR QL: NEGATIVE
NRBC # BLD AUTO: 0 10E3/UL
NRBC BLD AUTO-RTO: 0 /100
PH UR STRIP: 5.5 [PH] (ref 5–9)
PLATELET # BLD AUTO: 287 10E3/UL (ref 150–450)
POTASSIUM SERPL-SCNC: 4.2 MMOL/L (ref 3.4–5.3)
PROT SERPL-MCNC: 7.4 G/DL (ref 6.4–8.3)
RBC # BLD AUTO: 4.86 10E6/UL (ref 3.8–5.2)
RBC URINE: 12 /HPF
SODIUM SERPL-SCNC: 138 MMOL/L (ref 135–145)
SP GR UR STRIP: 1.03 (ref 1–1.03)
SQUAMOUS EPITHELIAL: 92 /HPF
UROBILINOGEN UR STRIP-MCNC: NORMAL MG/DL
WBC # BLD AUTO: 8.5 10E3/UL (ref 4–11)
WBC URINE: >182 /HPF

## 2025-06-05 PROCEDURE — 36415 COLL VENOUS BLD VENIPUNCTURE: CPT | Mod: ZL | Performed by: NURSE PRACTITIONER

## 2025-06-05 PROCEDURE — 86140 C-REACTIVE PROTEIN: CPT | Mod: ZL | Performed by: NURSE PRACTITIONER

## 2025-06-05 PROCEDURE — 85004 AUTOMATED DIFF WBC COUNT: CPT | Mod: ZL | Performed by: NURSE PRACTITIONER

## 2025-06-05 PROCEDURE — 81001 URINALYSIS AUTO W/SCOPE: CPT | Mod: ZL | Performed by: NURSE PRACTITIONER

## 2025-06-05 PROCEDURE — 82374 ASSAY BLOOD CARBON DIOXIDE: CPT | Mod: ZL | Performed by: NURSE PRACTITIONER

## 2025-06-05 RX ORDER — FLUCONAZOLE 150 MG/1
150 TABLET ORAL ONCE
Qty: 1 TABLET | Refills: 0 | Status: SHIPPED | OUTPATIENT
Start: 2025-06-05 | End: 2025-06-05

## 2025-06-05 RX ORDER — CEFDINIR 300 MG/1
300 CAPSULE ORAL 2 TIMES DAILY
Qty: 14 CAPSULE | Refills: 0 | Status: SHIPPED | OUTPATIENT
Start: 2025-06-05 | End: 2025-06-12

## 2025-06-05 ASSESSMENT — ENCOUNTER SYMPTOMS
ABDOMINAL PAIN: 1
FLANK PAIN: 1
FATIGUE: 1
ENDOCRINE NEGATIVE: 1
CARDIOVASCULAR NEGATIVE: 1
ABDOMINAL DISTENTION: 1
FREQUENCY: 1
PSYCHIATRIC NEGATIVE: 1
EYES NEGATIVE: 1
RESPIRATORY NEGATIVE: 1
APPETITE CHANGE: 1
ACTIVITY CHANGE: 1
VOMITING: 1
HEMATOLOGIC/LYMPHATIC NEGATIVE: 1
NEUROLOGICAL NEGATIVE: 1
CONSTIPATION: 1
NAUSEA: 1

## 2025-06-05 ASSESSMENT — PAIN SCALES - GENERAL: PAINLEVEL_OUTOF10: NO PAIN (0)

## 2025-06-05 NOTE — NURSING NOTE
"Chief Complaint   Patient presents with    UTI   Patient presents to the rapid clinic today for concerns of a UTI. Symptoms of vomiting and flank pain started yesterday.     Initial /78 (BP Location: Left arm, Patient Position: Sitting, Cuff Size: Adult Regular)   Pulse 89   Temp 98.2  F (36.8  C) (Temporal)   Resp 20   Ht 1.626 m (5' 4\")   Wt 92.5 kg (204 lb)   SpO2 96%   BMI 35.02 kg/m   Estimated body mass index is 35.02 kg/m  as calculated from the following:    Height as of this encounter: 1.626 m (5' 4\").    Weight as of this encounter: 92.5 kg (204 lb).  Medication Review: complete    The next two questions are to help us understand your food security.  If you are feeling you need any assistance in this area, we have resources available to support you today.          6/5/2025   SDOH- Food Insecurity   Within the past 12 months, did you worry that your food would run out before you got money to buy more? N   Within the past 12 months, did the food you bought just not last and you didn t have money to get more? N         Health Care Directive:  Patient does not have a Health Care Directive: Discussed advance care planning with patient; however, patient declined at this time.    Benny Yancey      " Patient states Dr. Rajiv Troy told her to take two tablets per day. Asking if Dr can update prescription and send it to pharmacy.

## 2025-06-05 NOTE — PATIENT INSTRUCTIONS
Urinary Tract Infection (UTI) in Women: Care Instructions  Overview     A urinary tract infection (UTI) is an infection caused by bacteria. It can happen anywhere in the urinary tract. A UTI can happen in the:  Kidneys.  Ureters, the tubes that connect the kidneys to the bladder.  Bladder.  Urethra, where the urine comes out.  Most UTIs are bladder infections. They often cause pain or burning when you urinate.  Most UTIs can be cured with antibiotics. If you are prescribed antibiotics, be sure to complete your treatment so that the infection does not get worse.  Follow-up care is a key part of your treatment and safety. Be sure to make and go to all appointments, and call your doctor if you are having problems. It's also a good idea to know your test results and keep a list of the medicines you take.  How can you care for yourself at home?  Take your antibiotics as directed. Do not stop taking them just because you feel better. You need to take the full course of antibiotics.  Drink extra water and other fluids for the next day or two. This will help make the urine less concentrated and help wash out the bacteria that are causing the infection. (If you have kidney, heart, or liver disease and have to limit fluids, talk with your doctor before you increase the amount of fluids you drink.)  Avoid drinks that are carbonated or have caffeine. They can irritate the bladder.  Urinate often. Try to empty your bladder each time.  To relieve pain, take a hot bath or lay a heating pad set on low over your lower belly or genital area. Never go to sleep with a heating pad in place.  To prevent UTIs  Drink plenty of water each day. This helps you urinate often, which clears bacteria from your system. (If you have kidney, heart, or liver disease and have to limit fluids, talk with your doctor before you increase the amount of fluids you drink.)  Urinate when you need to.  If you are sexually active, urinate right after you have  "sex.  Change sanitary pads often.  Avoid douches, bubble baths, feminine hygiene sprays, and other feminine hygiene products that have deodorants.  After going to the bathroom, wipe from front to back.  When should you call for help?   Call your doctor now or seek immediate medical care if:    You have new or worse fever, chills, nausea, or vomiting.     You have new pain in your back just below your rib cage. This is called flank pain.     There is new blood or pus in your urine.     You have any problems with your antibiotic medicine.   Watch closely for changes in your health, and be sure to contact your doctor if:    You are not getting better after taking an antibiotic for 2 days.     Your symptoms go away but then come back.   Where can you learn more?  Go to https://www.[employer].net/patiented  Enter K848 in the search box to learn more about \"Urinary Tract Infection (UTI) in Women: Care Instructions.\"  Current as of: April 30, 2024  Content Version: 14.4    6568-0810 [employer].   Care instructions adapted under license by your healthcare professional. If you have questions about a medical condition or this instruction, always ask your healthcare professional. [employer] disclaims any warranty or liability for your use of this information.    "

## 2025-06-05 NOTE — PROGRESS NOTES
Stephanie Mcclelland  1980    ASSESSMENT/PLAN      Presents to [facility] with right upper middle quadrant abdominal pain with nausea and vomiting.  Patient has also had back pain and flank pain.  Patient took a home test for urinary tract infection which was positive.  Patient has not had fever or chills.  No diarrhea.  Patient has been constipated.  Patient is on semaglutide but has been on since March with no recent dose changes.  Urinalysis obtained and does show signs of infection.  CBC, CMP and CRP overall stable.  CRP mildly elevated at 24.  Will treat for bladder infection at this time.  Recommend bowel management to manage constipation.  Follow-up if symptoms or not improving or worsening for reevaluation.  Patient's vitals are stable and she appears nontoxic.        1. UTI symptoms (Primary)    - UA with Microscopic reflex to Culture  - Urine Culture - pending     2. RUQ abdominal pain  3. Flank pain  4. Nausea and vomiting, unspecified vomiting type    - CBC and Differential  - Comprehensive Metabolic Panel  - CRP inflammation  - XR Abdomen 2 Views  - May use over-the-counter Tylenol or ibuprofen PRN  - Follow up as needed for new or worsening symptoms      5. Acute UTI (urinary tract infection)    - cefdinir (OMNICEF) 300 MG capsule; Take 1 capsule (300 mg) by mouth 2 times daily for 7 days.  Dispense: 14 capsule; Refill: 0    6. Antibiotic-induced yeast infection    - fluconazole (DIFLUCAN) 150 MG tablet; Take 1 tablet (150 mg) by mouth once for 1 dose.  Dispense: 1 tablet; Refill: 0       *A shared decision making model was used.   *Patient and/or associated parties understood and were agreeable to treatment plan.   *Plan and all results were discussed.   *Explanation of diagnosis, treatment options and risk and benefits of medications reviewed with patient.    *Time was taken to answer all questions.   *Red flags symptoms were discussed and patient was advised when they should return for reevaluation  "or for prompt emergency evaluation.   *Patient was given verbal and written instructions on plan of care. Instructions were printed or are available on [employer] on electronic AVS.   *We discussed potential side effects of any prescribed or recommended therapies, as well as expectations for response to treatments.  *Patient discharged in stable condition    Tea Horvath CNP  [facility]    SUBJECTIVE  CHIEF COMPLAINT/ REASON FOR VISIT  Patient presents with:  UTI     HISTORY OF PRESENT ILLNESS  Stephanie Mcclelland is a pleasant 44 year old female presents to [facility] today with right upper middle quadrant abdominal pain with nausea and vomiting.  Patient has also had back pain and flank pain.  Patient took a home test for urinary tract infection which was positive.  Patient has not had fever or chills.  No diarrhea.  Patient has been constipated.  Patient is on semaglutide but has been on since March with no recent dose changes.         I have reviewed the nursing notes.  I have reviewed allergies, medication list, problem list, and past medical history.    REVIEW OF SYSTEMS  Review of Systems   Constitutional:  Positive for activity change, appetite change and fatigue.   HENT: Negative.     Eyes: Negative.    Respiratory: Negative.     Cardiovascular: Negative.    Gastrointestinal:  Positive for abdominal distention, abdominal pain, constipation, nausea and vomiting.   Endocrine: Negative.    Genitourinary:  Positive for flank pain and frequency.   Skin: Negative.    Neurological: Negative.    Hematological: Negative.    Psychiatric/Behavioral: Negative.     All other systems reviewed and are negative.       VITAL SIGNS  Vitals:    06/05/25 0956   BP: 111/78   BP Location: Left arm   Patient Position: Sitting   Cuff Size: Adult Regular   Pulse: 89   Resp: 20   Temp: 98.2  F (36.8  C)   TempSrc: Temporal   SpO2: 96%   Weight: 92.5 kg (204 lb)   Height: 1.626 m (5' 4\")      Body mass index is " 35.02 kg/m .      OBJECTIVE  PHYSICAL EXAM  Physical Exam  Vitals and nursing note reviewed.   Constitutional:       Appearance: Normal appearance.   HENT:      Head: Normocephalic.      Nose: Nose normal.      Mouth/Throat:      Mouth: Mucous membranes are moist.   Cardiovascular:      Rate and Rhythm: Normal rate and regular rhythm.      Pulses: Normal pulses.      Heart sounds: Normal heart sounds.   Pulmonary:      Effort: Pulmonary effort is normal.      Breath sounds: Normal breath sounds.   Abdominal:      Palpations: Abdomen is soft.   Musculoskeletal:         General: Normal range of motion.   Skin:     General: Skin is warm and dry.      Capillary Refill: Capillary refill takes less than 2 seconds.   Neurological:      General: No focal deficit present.      Mental Status: She is alert.            DIAGNOSTICS  Results for orders placed or performed in visit on 06/05/25   XR Abdomen 2 Views     Status: None    Narrative    PROCEDURE:  XR ABDOMEN 2 VIEWS    HISTORY:  RUQ abdominal pain; Flank pain; Nausea and vomiting,  unspecified vomiting type    TECHNIQUE:  AP upright and supine radiographs of the abdomen.    COMPARISON:  None.    FINDINGS:     The lung bases are clear. No subdiaphragmatic air is seen.    No dilated loops of small bowel or air-fluid levels are seen. An IUD  projects over the central pelvis.      Impression    IMPRESSION:    No obstruction or free air.    REGINE RUIZ MD         SYSTEM ID:  [id number]   UA with Microscopic reflex to Culture     Status: Abnormal    Specimen: Urine, Midstream   Result Value Ref Range    Color Urine Yellow Colorless, Straw, Light Yellow, Yellow    Appearance Urine Slightly Cloudy (A) Clear    Glucose Urine Negative Negative mg/dL    Bilirubin Urine Negative Negative    Ketones Urine Negative Negative mg/dL    Specific Gravity Urine 1.027 1.000 - 1.030    Blood Urine Trace (A) Negative    pH Urine 5.5 5.0 - 9.0    Protein Albumin Urine 30 (A) Negative  mg/dL    Urobilinogen Urine Normal Normal mg/dL    Nitrite Urine Negative Negative    Leukocyte Esterase Urine Large (A) Negative    Bacteria Urine Few (A) None Seen /HPF    Mucus Urine Present (A) None Seen /LPF    RBC Urine 12 (H) <=2 /HPF    WBC Urine >182 (H) <=5 /HPF    Squamous Epithelials Urine 92 (H) <=1 /HPF    Narrative    Urine Culture ordered based on laboratory criteria   Comprehensive Metabolic Panel     Status: Abnormal   Result Value Ref Range    Sodium 138 135 - 145 mmol/L    Potassium 4.2 3.4 - 5.3 mmol/L    Carbon Dioxide (CO2) 23 22 - 29 mmol/L    Anion Gap 11 7 - 15 mmol/L    Urea Nitrogen 9.6 6.0 - 20.0 mg/dL    Creatinine 0.86 0.51 - 0.95 mg/dL    GFR Estimate 85 >60 mL/min/1.73m2    Calcium 9.0 8.8 - 10.4 mg/dL    Chloride 104 98 - 107 mmol/L    Glucose 100 (H) 70 - 99 mg/dL    Alkaline Phosphatase 82 40 - 150 U/L    AST 15 0 - 45 U/L    ALT 14 0 - 50 U/L    Protein Total 7.4 6.4 - 8.3 g/dL    Albumin 4.3 3.5 - 5.2 g/dL    Bilirubin Total 0.5 <=1.2 mg/dL   CRP inflammation     Status: Abnormal   Result Value Ref Range    CRP Inflammation 24.35 (H) <5.00 mg/L   CBC with platelets and differential     Status: None   Result Value Ref Range    WBC Count 8.5 4.0 - 11.0 10e3/uL    RBC Count 4.86 3.80 - 5.20 10e6/uL    Hemoglobin 14.4 11.7 - 15.7 g/dL    Hematocrit 42.3 35.0 - 47.0 %    MCV 87 78 - 100 fL    MCH 29.6 26.5 - 33.0 pg    MCHC 34.0 31.5 - 36.5 g/dL    RDW 12.4 10.0 - 15.0 %    Platelet Count 287 150 - 450 10e3/uL    % Neutrophils 63 %    % Lymphocytes 26 %    % Monocytes 8 %    % Eosinophils 2 %    % Basophils 1 %    % Immature Granulocytes 0 %    NRBCs per 100 WBC 0 <1 /100    Absolute Neutrophils 5.4 1.6 - 8.3 10e3/uL    Absolute Lymphocytes 2.2 0.8 - 5.3 10e3/uL    Absolute Monocytes 0.7 0.0 - 1.3 10e3/uL    Absolute Eosinophils 0.2 0.0 - 0.7 10e3/uL    Absolute Basophils 0.0 0.0 - 0.2 10e3/uL    Absolute Immature Granulocytes 0.0 <=0.4 10e3/uL    Absolute NRBCs 0.0 10e3/uL   CBC and  Differential     Status: None    Narrative    The following orders were created for panel order CBC and Differential.  Procedure                               Abnormality         Status                     ---------                               -----------         ------                     CBC with platelets and ...[[id number]]                      Final result                 Please view results for these tests on the individual orders.

## 2025-06-07 ENCOUNTER — HEALTH MAINTENANCE LETTER (OUTPATIENT)
Age: 45
End: 2025-06-07

## 2025-07-21 ENCOUNTER — HOSPITAL ENCOUNTER (OUTPATIENT)
Dept: MAMMOGRAPHY | Facility: OTHER | Age: 45
Discharge: HOME | End: 2025-07-21
Admitting: FAMILY MEDICINE
Payer: COMMERCIAL

## 2025-07-21 DIAGNOSIS — Z12.31 VISIT FOR SCREENING MAMMOGRAM: ICD-10-CM

## 2025-07-21 PROCEDURE — 77067 SCR MAMMO BI INCL CAD: CPT
